# Patient Record
Sex: MALE | Race: WHITE | Employment: OTHER | URBAN - METROPOLITAN AREA
[De-identification: names, ages, dates, MRNs, and addresses within clinical notes are randomized per-mention and may not be internally consistent; named-entity substitution may affect disease eponyms.]

---

## 2017-02-24 RX ORDER — ENALAPRIL MALEATE 2.5 MG/1
2.5 TABLET ORAL DAILY
Qty: 90 TABLET | Refills: 3 | Status: SHIPPED | OUTPATIENT
Start: 2017-02-24 | End: 2018-05-31

## 2017-02-24 NOTE — TELEPHONE ENCOUNTER
High drug-drug interaction between enalapril and eplerenone-to [facility] to please review and advise if ok to proceed with refill

## 2017-03-16 ENCOUNTER — TELEPHONE (OUTPATIENT)
Dept: PULMONOLOGY | Facility: CLINIC | Age: 82
End: 2017-03-16

## 2017-03-16 NOTE — TELEPHONE ENCOUNTER
Sched pt for appt w/ [facility] on 3/20 @ 3:30 pm at [facility]. Marcela Mooney was given appt time, location, & parking. She stts [facility], [facility] [place], & she wonders if he should be in Pulmonary Rehab again.  Exp

## 2017-03-16 NOTE — TELEPHONE ENCOUNTER
Devon Quezada requesting pt to be seen sooner than next avail - just returned from [facility] & wanted to make sure pt had continuity of care from [address] to [address]. Pls call.  Thank you

## 2017-03-20 ENCOUNTER — OFFICE VISIT (OUTPATIENT)
Dept: PULMONOLOGY | Facility: CLINIC | Age: 82
End: 2017-03-20

## 2017-03-20 VITALS
HEIGHT: 72 IN | DIASTOLIC BLOOD PRESSURE: 55 MMHG | RESPIRATION RATE: 18 BRPM | OXYGEN SATURATION: 92 % | SYSTOLIC BLOOD PRESSURE: 86 MMHG | BODY MASS INDEX: 31.88 KG/M2 | WEIGHT: 235.38 LBS | HEART RATE: 82 BPM

## 2017-03-20 DIAGNOSIS — J43.1 PANLOBULAR EMPHYSEMA (HCC): Primary | ICD-10-CM

## 2017-03-20 PROCEDURE — 99213 OFFICE O/P EST LOW 20 MIN: CPT | Performed by: INTERNAL MEDICINE

## 2017-03-20 PROCEDURE — G0463 HOSPITAL OUTPT CLINIC VISIT: HCPCS | Performed by: INTERNAL MEDICINE

## 2017-03-20 RX ORDER — TRAMADOL HYDROCHLORIDE 50 MG/1
TABLET ORAL
COMMUNITY
Start: 2017-03-13 | End: 2017-10-10

## 2017-03-20 RX ORDER — LEVALBUTEROL INHALATION SOLUTION 1.25 MG/3ML
SOLUTION RESPIRATORY (INHALATION)
COMMUNITY
Start: 2017-01-25 | End: 2017-03-20

## 2017-03-20 RX ORDER — UMECLIDINIUM 62.5 UG/1
AEROSOL, POWDER ORAL
COMMUNITY
Start: 2017-02-17 | End: 2021-01-13

## 2017-03-20 RX ORDER — FLUTICASONE FUROATE AND VILANTEROL TRIFENATATE 100; 25 UG/1; UG/1
POWDER RESPIRATORY (INHALATION)
COMMUNITY
Start: 2017-02-17 | End: 2021-01-13

## 2017-03-31 ENCOUNTER — TELEPHONE (OUTPATIENT)
Dept: PULMONOLOGY | Facility: CLINIC | Age: 82
End: 2017-03-31

## 2017-03-31 DIAGNOSIS — J44.1 CHRONIC OBSTRUCTIVE PULMONARY DISEASE WITH ACUTE EXACERBATION (HCC): Primary | ICD-10-CM

## 2017-03-31 NOTE — TELEPHONE ENCOUNTER
Wife is Requesting to get an order for [facility].  She is requesting for the order to be faxed to [address] at [facility]

## 2017-04-06 ENCOUNTER — TELEPHONE (OUTPATIENT)
Dept: PULMONOLOGY | Facility: CLINIC | Age: 82
End: 2017-04-06

## 2017-04-06 NOTE — TELEPHONE ENCOUNTER
Spoke to Mary Jo from pulmonary rehab. She states patient states he is doing phase 3. Pramod Valverde know we just saw the patient on 03/20/17 that if patient feels he can do phase 3 PJC is ok with this.

## 2017-04-11 ENCOUNTER — OFFICE VISIT (OUTPATIENT)
Dept: INTERNAL MEDICINE CLINIC | Facility: CLINIC | Age: 82
End: 2017-04-11

## 2017-04-11 VITALS
OXYGEN SATURATION: 92 % | HEART RATE: 78 BPM | HEIGHT: 72 IN | SYSTOLIC BLOOD PRESSURE: 120 MMHG | WEIGHT: 238 LBS | DIASTOLIC BLOOD PRESSURE: 60 MMHG | BODY MASS INDEX: 32.23 KG/M2 | TEMPERATURE: 98 F

## 2017-04-11 DIAGNOSIS — I10 HYPERTENSION, ESSENTIAL: Primary | ICD-10-CM

## 2017-04-11 DIAGNOSIS — G57.12 MERALGIA PARAESTHETICA, LEFT: ICD-10-CM

## 2017-04-11 DIAGNOSIS — I25.5 ISCHEMIC CARDIOMYOPATHY: ICD-10-CM

## 2017-04-11 DIAGNOSIS — E78.5 HYPERLIPIDEMIA, UNSPECIFIED HYPERLIPIDEMIA TYPE: ICD-10-CM

## 2017-04-11 DIAGNOSIS — J44.9 CHRONIC OBSTRUCTIVE PULMONARY DISEASE, UNSPECIFIED COPD TYPE (HCC): ICD-10-CM

## 2017-04-11 PROCEDURE — 99214 OFFICE O/P EST MOD 30 MIN: CPT | Performed by: INTERNAL MEDICINE

## 2017-04-11 PROCEDURE — G0463 HOSPITAL OUTPT CLINIC VISIT: HCPCS | Performed by: INTERNAL MEDICINE

## 2017-04-11 NOTE — PROGRESS NOTES
Heather Pollack is a 80year old male who presents for     Here with his wife.    Check at 6 months. IHD with hx CABG:  Dr. Linda Godfrey did cardiac cath 11/9/16. EF 30-35%. Mild pulm HTN. 50% distal LM-no SVG identified.    Per pt was told his chronic ADAMS is fro COMPLETE  12-    Comment Scanned to media tab 12-    ELECTROCARDIOGRAM, COMPLETE  12-    Comment Scanned to media tab 12-    ELECTROCARDIOGRAM, COMPLETE  01-    Comment Scanned to media tab 01-    CABG  2006    Com kg)  10/11/16 : 243 lb 12.8 oz (110.587 kg)      General: appears well nourished and in no acute distress  Neck: no adenopathy or thyroid abnormality  Lungs: clear to auscultation  Heart: regular rhythm, S1S2 normal without murmur  Abdomen: soft, nontender normal off Kcl. Ret in 6 months.      Patient and his wife express understanding of above issues and plan      Current Outpatient Prescriptions:  BREO ELLIPTA 100-25 MCG/INH Inhalation Aerosol Powder, Breath Activated  Disp:  Rfl:    INCRUSE ELLIPTA 62.5 M Tessa Gunter MD  4/11/2017

## 2017-04-13 ENCOUNTER — CARDPULM VISIT (OUTPATIENT)
Dept: CARDIAC REHAB | Facility: HOSPITAL | Age: 82
End: 2017-04-13

## 2017-04-18 ENCOUNTER — APPOINTMENT (OUTPATIENT)
Dept: CARDIAC REHAB | Facility: HOSPITAL | Age: 82
End: 2017-04-18

## 2017-04-18 ENCOUNTER — APPOINTMENT (OUTPATIENT)
Dept: CARDIAC REHAB | Facility: HOSPITAL | Age: 82
End: 2017-04-18
Attending: INTERNAL MEDICINE

## 2017-04-20 ENCOUNTER — APPOINTMENT (OUTPATIENT)
Dept: CARDIAC REHAB | Facility: HOSPITAL | Age: 82
End: 2017-04-20
Attending: INTERNAL MEDICINE

## 2017-04-25 ENCOUNTER — APPOINTMENT (OUTPATIENT)
Dept: CARDIAC REHAB | Facility: HOSPITAL | Age: 82
End: 2017-04-25
Attending: INTERNAL MEDICINE

## 2017-04-27 ENCOUNTER — APPOINTMENT (OUTPATIENT)
Dept: CARDIAC REHAB | Facility: HOSPITAL | Age: 82
End: 2017-04-27
Attending: INTERNAL MEDICINE

## 2017-05-02 ENCOUNTER — APPOINTMENT (OUTPATIENT)
Dept: CARDIAC REHAB | Facility: HOSPITAL | Age: 82
End: 2017-05-02
Attending: INTERNAL MEDICINE
Payer: MEDICARE

## 2017-05-04 ENCOUNTER — APPOINTMENT (OUTPATIENT)
Dept: CARDIAC REHAB | Facility: HOSPITAL | Age: 82
End: 2017-05-04
Attending: INTERNAL MEDICINE
Payer: MEDICARE

## 2017-05-09 ENCOUNTER — APPOINTMENT (OUTPATIENT)
Dept: CARDIAC REHAB | Facility: HOSPITAL | Age: 82
End: 2017-05-09
Attending: INTERNAL MEDICINE
Payer: MEDICARE

## 2017-05-11 ENCOUNTER — APPOINTMENT (OUTPATIENT)
Dept: CARDIAC REHAB | Facility: HOSPITAL | Age: 82
End: 2017-05-11
Attending: INTERNAL MEDICINE
Payer: MEDICARE

## 2017-07-11 ENCOUNTER — CARDPULM VISIT (OUTPATIENT)
Dept: CARDIAC REHAB | Facility: HOSPITAL | Age: 82
End: 2017-07-11
Attending: INTERNAL MEDICINE

## 2017-07-24 ENCOUNTER — TELEPHONE (OUTPATIENT)
Dept: PULMONOLOGY | Facility: CLINIC | Age: 82
End: 2017-07-24

## 2017-08-03 ENCOUNTER — HOSPITAL ENCOUNTER (OUTPATIENT)
Dept: CT IMAGING | Facility: HOSPITAL | Age: 82
Discharge: HOME | End: 2017-08-03
Attending: INTERNAL MEDICINE
Payer: MEDICARE

## 2017-08-03 DIAGNOSIS — R91.1 PULMONARY NODULE: ICD-10-CM

## 2017-08-03 PROCEDURE — 71250 CT THORAX DX C-: CPT | Performed by: INTERNAL MEDICINE

## 2017-08-09 ENCOUNTER — TELEPHONE (OUTPATIENT)
Dept: PULMONOLOGY | Facility: CLINIC | Age: 82
End: 2017-08-09

## 2017-08-09 DIAGNOSIS — R91.8 PULMONARY NODULES: Primary | ICD-10-CM

## 2017-08-09 NOTE — TELEPHONE ENCOUNTER
----- Message from Pepe Brandt MD sent at 8/5/2017  7:45 PM CDT -----  RN, I spoke with the patient regarding the results of his CT scan the chest.  Please add to the calendar a repeat CT scan of the chest at the 6 month interval.

## 2017-10-10 ENCOUNTER — OFFICE VISIT (OUTPATIENT)
Dept: INTERNAL MEDICINE CLINIC | Facility: CLINIC | Age: 82
End: 2017-10-10

## 2017-10-10 VITALS
OXYGEN SATURATION: 93 % | WEIGHT: 244 LBS | DIASTOLIC BLOOD PRESSURE: 60 MMHG | HEART RATE: 64 BPM | TEMPERATURE: 98 F | SYSTOLIC BLOOD PRESSURE: 112 MMHG | HEIGHT: 72 IN | BODY MASS INDEX: 33.05 KG/M2

## 2017-10-10 DIAGNOSIS — E78.5 HYPERLIPIDEMIA, UNSPECIFIED HYPERLIPIDEMIA TYPE: ICD-10-CM

## 2017-10-10 DIAGNOSIS — I10 HYPERTENSION, ESSENTIAL: ICD-10-CM

## 2017-10-10 DIAGNOSIS — J44.9 CHRONIC OBSTRUCTIVE PULMONARY DISEASE, UNSPECIFIED COPD TYPE (HCC): ICD-10-CM

## 2017-10-10 DIAGNOSIS — I25.5 ISCHEMIC CARDIOMYOPATHY: ICD-10-CM

## 2017-10-10 DIAGNOSIS — J32.9 SINUSITIS, UNSPECIFIED CHRONICITY, UNSPECIFIED LOCATION: Primary | ICD-10-CM

## 2017-10-10 PROCEDURE — G0008 ADMIN INFLUENZA VIRUS VAC: HCPCS | Performed by: INTERNAL MEDICINE

## 2017-10-10 PROCEDURE — 99214 OFFICE O/P EST MOD 30 MIN: CPT | Performed by: INTERNAL MEDICINE

## 2017-10-10 PROCEDURE — 90653 IIV ADJUVANT VACCINE IM: CPT | Performed by: INTERNAL MEDICINE

## 2017-10-10 PROCEDURE — G0463 HOSPITAL OUTPT CLINIC VISIT: HCPCS | Performed by: INTERNAL MEDICINE

## 2017-10-10 RX ORDER — TRAMADOL HYDROCHLORIDE 50 MG/1
50 TABLET ORAL EVERY 12 HOURS PRN
Qty: 90 TABLET | Refills: 0 | Status: SHIPPED
Start: 2017-10-10 | End: 2018-04-10

## 2017-10-10 RX ORDER — CEFUROXIME AXETIL 250 MG/1
250 TABLET ORAL 2 TIMES DAILY
Qty: 20 TABLET | Refills: 0 | Status: SHIPPED | OUTPATIENT
Start: 2017-10-10 | End: 2018-04-10

## 2017-10-10 NOTE — PROGRESS NOTES
Fredis Thomas is a 80year old male who presents for     Here with his wife.    Check at 6 months. I have drainage coming down with mucus started a month ago. Post nasal drip. Yellow. His wife feels he has a sinus infection. rhinocort helps.  Has nasa Comment: Bypass-2006, double bypass  12-: ELECTROCARDIOGRAM, COMPLETE      Comment: Scanned to media tab 12- 12-: ELECTROCARDIOGRAM, COMPLETE      Comment: Scanned to media tab 12- 01-: ELECTROCARDIOGRAM, COMPLETE kg)  11/02/16 : 238 lb (108 kg)  O2 sat on RA is 84% --recheck on 2 L is 93%    General: appears well nourished and in no acute distress  Post pharynx ok. Nasal septal dev to L.    Neck: no adenopathy or thyroid abnormality  Lungs: clear to auscultation  He normal off Kcl.     Ret in 6 months. (after ret from [place])      Patient and his wife express understanding of above issues and plan         Current Outpatient Prescriptions:  TraMADol HCl 50 MG Oral Tab Take 1 tablet (50 mg total) by mouth every 12 (twelve) h Suspension 1 spray by Nasal route as needed. Disp:  Rfl:    Albuterol Sulfate HFA (PROAIR HFA) 108 (90 BASE) MCG/ACT Inhalation Aero Soln Inhale 2 puffs into the lungs as needed.    Disp:  Rfl:          Annelise Aguila MD  10/10/2017

## 2017-10-30 ENCOUNTER — TELEPHONE (OUTPATIENT)
Dept: PULMONOLOGY | Facility: CLINIC | Age: 82
End: 2017-10-30

## 2017-10-30 NOTE — TELEPHONE ENCOUNTER
Pts spouse calling for pt, requesting for pt to be seen asap due to pt has copd and is having breathing difficulties. Spouse indicates pt was instructed to call in if pt was not getting any better. Pls call wife at:[phone],thanks.   *Pt has a f/up grecia

## 2017-11-07 ENCOUNTER — OFFICE VISIT (OUTPATIENT)
Dept: PULMONOLOGY | Facility: CLINIC | Age: 82
End: 2017-11-07

## 2017-11-07 VITALS
SYSTOLIC BLOOD PRESSURE: 97 MMHG | BODY MASS INDEX: 32.91 KG/M2 | OXYGEN SATURATION: 93 % | HEART RATE: 75 BPM | HEIGHT: 72 IN | WEIGHT: 243 LBS | DIASTOLIC BLOOD PRESSURE: 61 MMHG | RESPIRATION RATE: 19 BRPM

## 2017-11-07 DIAGNOSIS — J43.1 PANLOBULAR EMPHYSEMA (HCC): Primary | ICD-10-CM

## 2017-11-07 PROCEDURE — G0463 HOSPITAL OUTPT CLINIC VISIT: HCPCS | Performed by: INTERNAL MEDICINE

## 2017-11-07 PROCEDURE — 99213 OFFICE O/P EST LOW 20 MIN: CPT | Performed by: INTERNAL MEDICINE

## 2017-11-07 RX ORDER — LEVOFLOXACIN 500 MG/1
500 TABLET, FILM COATED ORAL DAILY
Qty: 7 TABLET | Refills: 0 | Status: SHIPPED | OUTPATIENT
Start: 2017-11-07 | End: 2018-04-10

## 2017-11-07 RX ORDER — PREDNISONE 20 MG/1
TABLET ORAL
Qty: 10 TABLET | Refills: 0 | Status: SHIPPED | OUTPATIENT
Start: 2017-11-07 | End: 2018-04-10

## 2017-11-07 NOTE — PROGRESS NOTES
The patient is a 79-year-old male who I know well from prior evaluation comes in now for follow-up. He has underlying COPD and has a recurrent bronchitis now. He received a course of cefuroxime.   His supplemental oxygen does not work really well with res

## 2018-01-30 ENCOUNTER — TELEPHONE (OUTPATIENT)
Dept: PULMONOLOGY | Facility: CLINIC | Age: 83
End: 2018-01-30

## 2018-02-12 ENCOUNTER — TELEPHONE (OUTPATIENT)
Dept: PULMONOLOGY | Facility: CLINIC | Age: 83
End: 2018-02-12

## 2018-02-12 NOTE — TELEPHONE ENCOUNTER
Refer to TE 1/30/18-Pts wife states she has been trying to reach Managed care regarding PA needed for CT CHEST and has not been able to speak to anyone.  Pts wife requesting a call back [phone]

## 2018-02-12 NOTE — TELEPHONE ENCOUNTER
Managed Care: please contact patient wife re: PA for CT per message below. Let us know when completed.

## 2018-02-13 NOTE — TELEPHONE ENCOUNTER
Pt wife confirmed pt primary insurance is Medicare Part B, informed pt wife no prior authorization is necessary she could go ahead and schedule CT, pt wife voiced understanding.

## 2018-02-23 RX ORDER — ENALAPRIL MALEATE 2.5 MG/1
TABLET ORAL
Qty: 90 TABLET | Refills: 3 | OUTPATIENT
Start: 2018-02-23

## 2018-02-23 NOTE — TELEPHONE ENCOUNTER
Spoke to wife - we have not had any labwork since 2016, but wife reports to me that his [place] MD does his prescriptions;    They will follow up   No action at this time

## 2018-04-10 ENCOUNTER — OFFICE VISIT (OUTPATIENT)
Dept: INTERNAL MEDICINE CLINIC | Facility: CLINIC | Age: 83
End: 2018-04-10

## 2018-04-10 VITALS
TEMPERATURE: 98 F | OXYGEN SATURATION: 90 % | DIASTOLIC BLOOD PRESSURE: 64 MMHG | BODY MASS INDEX: 32.64 KG/M2 | WEIGHT: 241 LBS | SYSTOLIC BLOOD PRESSURE: 116 MMHG | HEIGHT: 72 IN | HEART RATE: 96 BPM

## 2018-04-10 DIAGNOSIS — E78.5 HYPERLIPIDEMIA, UNSPECIFIED HYPERLIPIDEMIA TYPE: ICD-10-CM

## 2018-04-10 DIAGNOSIS — J44.9 CHRONIC OBSTRUCTIVE PULMONARY DISEASE, UNSPECIFIED COPD TYPE (HCC): ICD-10-CM

## 2018-04-10 DIAGNOSIS — I10 HYPERTENSION, ESSENTIAL: Primary | ICD-10-CM

## 2018-04-10 DIAGNOSIS — I25.9 ISCHEMIC HEART DISEASE: ICD-10-CM

## 2018-04-10 PROCEDURE — G0463 HOSPITAL OUTPT CLINIC VISIT: HCPCS | Performed by: INTERNAL MEDICINE

## 2018-04-10 PROCEDURE — 99214 OFFICE O/P EST MOD 30 MIN: CPT | Performed by: INTERNAL MEDICINE

## 2018-04-10 RX ORDER — TRAMADOL HYDROCHLORIDE 50 MG/1
50 TABLET ORAL EVERY 12 HOURS PRN
Qty: 90 TABLET | Refills: 0 | Status: SHIPPED
Start: 2018-04-10 | End: 2018-10-09

## 2018-04-10 NOTE — PROGRESS NOTES
Francisco J Lazo is a [de-identified]year old male who presents for     Here with his wife.    Check at 6 months. IHD with hx CABG:  As review Dr. Lawson Santa did cardiac cath 11/9/16. EF 30-35%. Mild pulm HTN. 50% distal LM-no SVG identified.    No CP but has chronic ADAMS.   COMPLETE      Comment: Scanned to media tab 12- 01-: ELECTROCARDIOGRAM, COMPLETE      Comment: Scanned to media tab 01-  2012: EYE SURGERY      Comment: Eye surgery for strep infection   Family History   Problem Relation Age of Onset abnormality  Lungs: clear to auscultation, some fine exp wheezing.    Heart: regular rhythm, S1S2 normal without murmur  Abdomen: soft, nontender without mass or hepatosplenomegaly  Extremities: no edema  Neurologic: alert and oriented      ASSESSMENT AND P 90 tablet Rfl: 1   TraMADol HCl 50 MG Oral Tab Take 1 tablet (50 mg total) by mouth every 12 (twelve) hours as needed for Pain.  Disp: 90 tablet Rfl: 0   BREO ELLIPTA 100-25 MCG/INH Inhalation Aerosol Powder, Breath Activated  Disp:  Rfl:    INCRUSE ELLIPTA

## 2018-04-11 ENCOUNTER — HOSPITAL ENCOUNTER (OUTPATIENT)
Dept: CT IMAGING | Facility: HOSPITAL | Age: 83
Discharge: HOME | End: 2018-04-11
Attending: INTERNAL MEDICINE
Payer: MEDICARE

## 2018-04-11 DIAGNOSIS — R91.8 PULMONARY NODULES: ICD-10-CM

## 2018-04-11 PROCEDURE — 71250 CT THORAX DX C-: CPT | Performed by: INTERNAL MEDICINE

## 2018-04-16 ENCOUNTER — OFFICE VISIT (OUTPATIENT)
Dept: PULMONOLOGY | Facility: CLINIC | Age: 83
End: 2018-04-16

## 2018-04-16 VITALS
HEART RATE: 99 BPM | HEIGHT: 72 IN | WEIGHT: 240 LBS | SYSTOLIC BLOOD PRESSURE: 106 MMHG | DIASTOLIC BLOOD PRESSURE: 68 MMHG | OXYGEN SATURATION: 92 % | BODY MASS INDEX: 32.51 KG/M2

## 2018-04-16 DIAGNOSIS — R06.00 DYSPNEA ON EXERTION: ICD-10-CM

## 2018-04-16 DIAGNOSIS — J44.9 CHRONIC OBSTRUCTIVE PULMONARY DISEASE, UNSPECIFIED COPD TYPE (HCC): ICD-10-CM

## 2018-04-16 DIAGNOSIS — J43.1 PANLOBULAR EMPHYSEMA (HCC): Primary | ICD-10-CM

## 2018-04-16 PROCEDURE — 99213 OFFICE O/P EST LOW 20 MIN: CPT | Performed by: INTERNAL MEDICINE

## 2018-04-16 PROCEDURE — G0463 HOSPITAL OUTPT CLINIC VISIT: HCPCS | Performed by: INTERNAL MEDICINE

## 2018-04-16 NOTE — PROGRESS NOTES
The patient is an 21-year-old male who I know well from prior evaluation of comes in now for follow-up. His CT scan the chest was stable. His chest x-ray from [place] recently was also unremarkable except for hyperinflation.   He is doing well clinically lifestyle.

## 2018-04-19 ENCOUNTER — TELEPHONE (OUTPATIENT)
Dept: PULMONOLOGY | Facility: CLINIC | Age: 83
End: 2018-04-19

## 2018-04-19 NOTE — TELEPHONE ENCOUNTER
Per [facility] lost the bid medicare bid for wheelchairs. Pts wife Maria De Jesus Multani notified and is aware that they should call their insurance  since it is listed as first insurance.  Steven Junior she will call  and give us a call with the DME p

## 2018-05-11 ENCOUNTER — TELEPHONE (OUTPATIENT)
Dept: PULMONOLOGY | Facility: CLINIC | Age: 83
End: 2018-05-11

## 2018-05-11 DIAGNOSIS — J43.1 PANLOBULAR EMPHYSEMA (HCC): Primary | ICD-10-CM

## 2018-05-11 NOTE — TELEPHONE ENCOUNTER
Pts wife came in asking for recommendations for a company to get a WC previous(  TE 4-19 with [facility] LPN) wife wants to speak to someone pls advise

## 2018-05-11 NOTE — TELEPHONE ENCOUNTER
Spoke to pt who sttd that medicare should be first on insurance info. Spoke to [place] [id number] who corrected the issue. Pt notified that [facility] accepts medicare for First [employer] [phone]. Pt wife stts she wants order faxed to [email or web address].  Or

## 2018-06-14 NOTE — TELEPHONE ENCOUNTER
Wheelchair order form and addendum to OV notes from 4-16-18 faxed to [facility] [phone]. Form sent to HIM for scanning.

## 2018-06-27 ENCOUNTER — LAB ENCOUNTER (OUTPATIENT)
Dept: LAB | Age: 83
End: 2018-06-27
Attending: NURSE PRACTITIONER
Payer: MEDICARE

## 2018-06-27 DIAGNOSIS — I50.22 CHRONIC SYSTOLIC CONGESTIVE HEART FAILURE (HCC): ICD-10-CM

## 2018-06-27 PROCEDURE — 36415 COLL VENOUS BLD VENIPUNCTURE: CPT

## 2018-06-27 PROCEDURE — 80048 BASIC METABOLIC PNL TOTAL CA: CPT

## 2018-08-29 ENCOUNTER — CARDPULM VISIT (OUTPATIENT)
Dept: CARDIAC REHAB | Facility: HOSPITAL | Age: 83
End: 2018-08-29
Attending: INTERNAL MEDICINE
Payer: MEDICARE

## 2018-08-29 DIAGNOSIS — I50.20 SYSTOLIC CONGESTIVE HEART FAILURE (HCC): ICD-10-CM

## 2018-09-05 ENCOUNTER — CARDPULM VISIT (OUTPATIENT)
Dept: CARDIAC REHAB | Facility: HOSPITAL | Age: 83
End: 2018-09-05
Attending: INTERNAL MEDICINE
Payer: MEDICARE

## 2018-09-05 PROCEDURE — 93798 PHYS/QHP OP CAR RHAB W/ECG: CPT

## 2018-09-07 ENCOUNTER — CARDPULM VISIT (OUTPATIENT)
Dept: CARDIAC REHAB | Facility: HOSPITAL | Age: 83
End: 2018-09-07
Attending: INTERNAL MEDICINE
Payer: MEDICARE

## 2018-09-07 PROCEDURE — 93798 PHYS/QHP OP CAR RHAB W/ECG: CPT

## 2018-09-10 ENCOUNTER — CARDPULM VISIT (OUTPATIENT)
Dept: CARDIAC REHAB | Facility: HOSPITAL | Age: 83
End: 2018-09-10
Attending: INTERNAL MEDICINE
Payer: MEDICARE

## 2018-09-10 PROCEDURE — 93798 PHYS/QHP OP CAR RHAB W/ECG: CPT

## 2018-09-12 ENCOUNTER — CARDPULM VISIT (OUTPATIENT)
Dept: CARDIAC REHAB | Facility: HOSPITAL | Age: 83
End: 2018-09-12
Attending: INTERNAL MEDICINE
Payer: MEDICARE

## 2018-09-12 PROCEDURE — 93798 PHYS/QHP OP CAR RHAB W/ECG: CPT

## 2018-09-14 ENCOUNTER — CARDPULM VISIT (OUTPATIENT)
Dept: CARDIAC REHAB | Facility: HOSPITAL | Age: 83
End: 2018-09-14
Attending: INTERNAL MEDICINE
Payer: MEDICARE

## 2018-09-14 PROCEDURE — 93798 PHYS/QHP OP CAR RHAB W/ECG: CPT

## 2018-09-17 ENCOUNTER — CARDPULM VISIT (OUTPATIENT)
Dept: CARDIAC REHAB | Facility: HOSPITAL | Age: 83
End: 2018-09-17
Attending: INTERNAL MEDICINE
Payer: MEDICARE

## 2018-09-17 PROCEDURE — 93798 PHYS/QHP OP CAR RHAB W/ECG: CPT

## 2018-09-19 ENCOUNTER — CARDPULM VISIT (OUTPATIENT)
Dept: CARDIAC REHAB | Facility: HOSPITAL | Age: 83
End: 2018-09-19
Attending: INTERNAL MEDICINE
Payer: MEDICARE

## 2018-09-19 PROCEDURE — 93798 PHYS/QHP OP CAR RHAB W/ECG: CPT

## 2018-09-21 ENCOUNTER — CARDPULM VISIT (OUTPATIENT)
Dept: CARDIAC REHAB | Facility: HOSPITAL | Age: 83
End: 2018-09-21
Attending: INTERNAL MEDICINE
Payer: MEDICARE

## 2018-09-21 PROCEDURE — 93798 PHYS/QHP OP CAR RHAB W/ECG: CPT

## 2018-09-24 ENCOUNTER — APPOINTMENT (OUTPATIENT)
Dept: CARDIAC REHAB | Facility: HOSPITAL | Age: 83
End: 2018-09-24
Attending: INTERNAL MEDICINE
Payer: MEDICARE

## 2018-09-26 ENCOUNTER — CARDPULM VISIT (OUTPATIENT)
Dept: CARDIAC REHAB | Facility: HOSPITAL | Age: 83
End: 2018-09-26
Attending: INTERNAL MEDICINE
Payer: MEDICARE

## 2018-09-26 PROCEDURE — 93798 PHYS/QHP OP CAR RHAB W/ECG: CPT

## 2018-09-28 ENCOUNTER — CARDPULM VISIT (OUTPATIENT)
Dept: CARDIAC REHAB | Facility: HOSPITAL | Age: 83
End: 2018-09-28
Attending: INTERNAL MEDICINE
Payer: MEDICARE

## 2018-09-28 PROCEDURE — 93798 PHYS/QHP OP CAR RHAB W/ECG: CPT

## 2018-10-01 ENCOUNTER — CARDPULM VISIT (OUTPATIENT)
Dept: CARDIAC REHAB | Facility: HOSPITAL | Age: 83
End: 2018-10-01
Attending: INTERNAL MEDICINE
Payer: MEDICARE

## 2018-10-01 PROCEDURE — 93798 PHYS/QHP OP CAR RHAB W/ECG: CPT

## 2018-10-03 ENCOUNTER — CARDPULM VISIT (OUTPATIENT)
Dept: CARDIAC REHAB | Facility: HOSPITAL | Age: 83
End: 2018-10-03
Attending: INTERNAL MEDICINE
Payer: MEDICARE

## 2018-10-03 PROCEDURE — 93798 PHYS/QHP OP CAR RHAB W/ECG: CPT

## 2018-10-05 ENCOUNTER — CARDPULM VISIT (OUTPATIENT)
Dept: CARDIAC REHAB | Facility: HOSPITAL | Age: 83
End: 2018-10-05
Attending: INTERNAL MEDICINE
Payer: MEDICARE

## 2018-10-05 PROCEDURE — 93798 PHYS/QHP OP CAR RHAB W/ECG: CPT

## 2018-10-08 ENCOUNTER — APPOINTMENT (OUTPATIENT)
Dept: CARDIAC REHAB | Facility: HOSPITAL | Age: 83
End: 2018-10-08
Attending: INTERNAL MEDICINE
Payer: MEDICARE

## 2018-10-09 ENCOUNTER — OFFICE VISIT (OUTPATIENT)
Dept: INTERNAL MEDICINE CLINIC | Facility: CLINIC | Age: 83
End: 2018-10-09
Payer: MEDICARE

## 2018-10-09 VITALS
BODY MASS INDEX: 32.64 KG/M2 | OXYGEN SATURATION: 93 % | DIASTOLIC BLOOD PRESSURE: 64 MMHG | WEIGHT: 241 LBS | HEIGHT: 72 IN | SYSTOLIC BLOOD PRESSURE: 110 MMHG | HEART RATE: 72 BPM | TEMPERATURE: 98 F

## 2018-10-09 DIAGNOSIS — I51.9 LV DYSFUNCTION: ICD-10-CM

## 2018-10-09 DIAGNOSIS — J44.9 CHRONIC OBSTRUCTIVE PULMONARY DISEASE, UNSPECIFIED COPD TYPE (HCC): Primary | ICD-10-CM

## 2018-10-09 DIAGNOSIS — I10 HYPERTENSION, ESSENTIAL: ICD-10-CM

## 2018-10-09 PROCEDURE — 99214 OFFICE O/P EST MOD 30 MIN: CPT | Performed by: INTERNAL MEDICINE

## 2018-10-09 PROCEDURE — 90653 IIV ADJUVANT VACCINE IM: CPT | Performed by: INTERNAL MEDICINE

## 2018-10-09 PROCEDURE — G0008 ADMIN INFLUENZA VIRUS VAC: HCPCS | Performed by: INTERNAL MEDICINE

## 2018-10-09 PROCEDURE — G0463 HOSPITAL OUTPT CLINIC VISIT: HCPCS | Performed by: INTERNAL MEDICINE

## 2018-10-09 RX ORDER — TRAMADOL HYDROCHLORIDE 50 MG/1
50 TABLET ORAL EVERY 12 HOURS PRN
Qty: 90 TABLET | Refills: 0 | Status: SHIPPED
Start: 2018-10-09 | End: 2019-04-09

## 2018-10-09 NOTE — PROGRESS NOTES
Michelet Garcia is a [de-identified]year old male who presents for     Here with his wife.    Check at 6 months.     IHD with hx CABG--HF  Dr Letty Terrazas added Jean Fails. Helped some. Going to cardiac rehab. No CP. Not using NTG.      Dr Letty Terrazas said difficult combination of emph SURGERY  2012    Eye surgery for strep infection      Family History   Problem Relation Age of Onset   • Cancer Sister         unknown skin CA   • Macular degeneration Sister    • Cancer Father          age 80 Cancer-bladder    • Alcohol and Other Diso Hypertension-controlled on entresto and coreg. No longer on metoprolol ER 75 mg daily or enalapril.     COPD-on  Breo,  incruse ellipta, proair inhaler and xopenex prn in neb. Uses 2 liters oxygen at night and prn day.    s/p pulm rehab.  CT chest 8/3/ 25 MG Oral Tab Take 1 tablet (25 mg total) by mouth once daily. Disp: 24 tablet Rfl: 0   TraMADol HCl 50 MG Oral Tab Take 1 tablet (50 mg total) by mouth every 12 (twelve) hours as needed for Pain.  Disp: 90 tablet Rfl: 0   BREO ELLIPTA 100-25 MCG/INH Inhal

## 2018-10-10 ENCOUNTER — APPOINTMENT (OUTPATIENT)
Dept: CARDIAC REHAB | Facility: HOSPITAL | Age: 83
End: 2018-10-10
Attending: INTERNAL MEDICINE
Payer: MEDICARE

## 2018-10-12 ENCOUNTER — CARDPULM VISIT (OUTPATIENT)
Dept: CARDIAC REHAB | Facility: HOSPITAL | Age: 83
End: 2018-10-12
Attending: INTERNAL MEDICINE
Payer: MEDICARE

## 2018-10-12 PROCEDURE — 93798 PHYS/QHP OP CAR RHAB W/ECG: CPT

## 2018-10-17 ENCOUNTER — CARDPULM VISIT (OUTPATIENT)
Dept: CARDIAC REHAB | Facility: HOSPITAL | Age: 83
End: 2018-10-17
Attending: INTERNAL MEDICINE
Payer: MEDICARE

## 2018-10-17 PROCEDURE — 93798 PHYS/QHP OP CAR RHAB W/ECG: CPT

## 2018-10-19 ENCOUNTER — CARDPULM VISIT (OUTPATIENT)
Dept: CARDIAC REHAB | Facility: HOSPITAL | Age: 83
End: 2018-10-19
Attending: INTERNAL MEDICINE
Payer: MEDICARE

## 2018-10-19 PROCEDURE — 93798 PHYS/QHP OP CAR RHAB W/ECG: CPT

## 2018-10-22 ENCOUNTER — CARDPULM VISIT (OUTPATIENT)
Dept: CARDIAC REHAB | Facility: HOSPITAL | Age: 83
End: 2018-10-22
Attending: INTERNAL MEDICINE
Payer: MEDICARE

## 2018-10-22 PROCEDURE — 93798 PHYS/QHP OP CAR RHAB W/ECG: CPT

## 2018-10-23 ENCOUNTER — TELEPHONE (OUTPATIENT)
Dept: PULMONOLOGY | Facility: CLINIC | Age: 83
End: 2018-10-23

## 2018-10-23 NOTE — TELEPHONE ENCOUNTER
Pt wife informed pt would need face to face office visit, ambulatory oximetry qualifying pt for portable concentrator. Pt oxygen liter flow fluctuates between 2-4 L, pt referred by pulm rehab for POC. Explained insurance guidelines f2f, amb ox w/n 30 days.

## 2018-10-24 ENCOUNTER — CARDPULM VISIT (OUTPATIENT)
Dept: CARDIAC REHAB | Facility: HOSPITAL | Age: 83
End: 2018-10-24
Attending: INTERNAL MEDICINE
Payer: MEDICARE

## 2018-10-24 PROCEDURE — 93798 PHYS/QHP OP CAR RHAB W/ECG: CPT

## 2018-10-29 ENCOUNTER — CARDPULM VISIT (OUTPATIENT)
Dept: CARDIAC REHAB | Facility: HOSPITAL | Age: 83
End: 2018-10-29
Attending: INTERNAL MEDICINE
Payer: MEDICARE

## 2018-10-29 PROCEDURE — 93798 PHYS/QHP OP CAR RHAB W/ECG: CPT

## 2018-10-31 ENCOUNTER — CARDPULM VISIT (OUTPATIENT)
Dept: CARDIAC REHAB | Facility: HOSPITAL | Age: 83
End: 2018-10-31
Attending: INTERNAL MEDICINE
Payer: MEDICARE

## 2018-10-31 PROCEDURE — 93798 PHYS/QHP OP CAR RHAB W/ECG: CPT

## 2018-11-02 ENCOUNTER — CARDPULM VISIT (OUTPATIENT)
Dept: CARDIAC REHAB | Facility: HOSPITAL | Age: 83
End: 2018-11-02
Attending: INTERNAL MEDICINE
Payer: MEDICARE

## 2018-11-02 PROCEDURE — 93798 PHYS/QHP OP CAR RHAB W/ECG: CPT

## 2018-11-05 ENCOUNTER — CARDPULM VISIT (OUTPATIENT)
Dept: CARDIAC REHAB | Facility: HOSPITAL | Age: 83
End: 2018-11-05
Attending: INTERNAL MEDICINE
Payer: MEDICARE

## 2018-11-05 PROCEDURE — 93798 PHYS/QHP OP CAR RHAB W/ECG: CPT

## 2018-11-07 ENCOUNTER — CARDPULM VISIT (OUTPATIENT)
Dept: CARDIAC REHAB | Facility: HOSPITAL | Age: 83
End: 2018-11-07
Attending: INTERNAL MEDICINE
Payer: MEDICARE

## 2018-11-07 PROCEDURE — 93798 PHYS/QHP OP CAR RHAB W/ECG: CPT

## 2018-11-09 ENCOUNTER — CARDPULM VISIT (OUTPATIENT)
Dept: CARDIAC REHAB | Facility: HOSPITAL | Age: 83
End: 2018-11-09
Attending: INTERNAL MEDICINE
Payer: MEDICARE

## 2018-11-09 PROCEDURE — 93798 PHYS/QHP OP CAR RHAB W/ECG: CPT

## 2018-11-12 ENCOUNTER — CARDPULM VISIT (OUTPATIENT)
Dept: CARDIAC REHAB | Facility: HOSPITAL | Age: 83
End: 2018-11-12
Attending: INTERNAL MEDICINE
Payer: MEDICARE

## 2018-11-12 PROCEDURE — 93798 PHYS/QHP OP CAR RHAB W/ECG: CPT

## 2018-11-14 ENCOUNTER — CARDPULM VISIT (OUTPATIENT)
Dept: CARDIAC REHAB | Facility: HOSPITAL | Age: 83
End: 2018-11-14
Attending: INTERNAL MEDICINE
Payer: MEDICARE

## 2018-11-14 PROCEDURE — 93798 PHYS/QHP OP CAR RHAB W/ECG: CPT

## 2018-11-19 ENCOUNTER — CARDPULM VISIT (OUTPATIENT)
Dept: CARDIAC REHAB | Facility: HOSPITAL | Age: 83
End: 2018-11-19
Attending: INTERNAL MEDICINE
Payer: MEDICARE

## 2018-11-19 PROCEDURE — 93798 PHYS/QHP OP CAR RHAB W/ECG: CPT

## 2018-11-20 ENCOUNTER — OFFICE VISIT (OUTPATIENT)
Dept: PULMONOLOGY | Facility: CLINIC | Age: 83
End: 2018-11-20
Payer: MEDICARE

## 2018-11-20 VITALS
RESPIRATION RATE: 18 BRPM | DIASTOLIC BLOOD PRESSURE: 61 MMHG | HEIGHT: 72 IN | WEIGHT: 239.88 LBS | HEART RATE: 58 BPM | SYSTOLIC BLOOD PRESSURE: 96 MMHG | BODY MASS INDEX: 32.49 KG/M2 | OXYGEN SATURATION: 95 %

## 2018-11-20 DIAGNOSIS — J43.1 PANLOBULAR EMPHYSEMA (HCC): Primary | ICD-10-CM

## 2018-11-20 PROCEDURE — 99213 OFFICE O/P EST LOW 20 MIN: CPT | Performed by: INTERNAL MEDICINE

## 2018-11-20 PROCEDURE — G0463 HOSPITAL OUTPT CLINIC VISIT: HCPCS | Performed by: INTERNAL MEDICINE

## 2018-11-20 NOTE — PROGRESS NOTES
The patient is an 47-year-old male who I know well from prior evaluation comes in now for follow-up.   In general, he is doing well except that he has significant dyspnea on exertion and he desaturates with the conserving device intermittent puff of nasal c

## 2018-11-21 ENCOUNTER — CARDPULM VISIT (OUTPATIENT)
Dept: CARDIAC REHAB | Facility: HOSPITAL | Age: 83
End: 2018-11-21
Attending: INTERNAL MEDICINE
Payer: MEDICARE

## 2018-11-21 PROCEDURE — 93798 PHYS/QHP OP CAR RHAB W/ECG: CPT

## 2018-11-26 ENCOUNTER — APPOINTMENT (OUTPATIENT)
Dept: CARDIAC REHAB | Facility: HOSPITAL | Age: 83
End: 2018-11-26
Attending: INTERNAL MEDICINE
Payer: MEDICARE

## 2018-11-28 ENCOUNTER — APPOINTMENT (OUTPATIENT)
Dept: CARDIAC REHAB | Facility: HOSPITAL | Age: 83
End: 2018-11-28
Attending: INTERNAL MEDICINE
Payer: MEDICARE

## 2018-11-30 ENCOUNTER — CARDPULM VISIT (OUTPATIENT)
Dept: CARDIAC REHAB | Facility: HOSPITAL | Age: 83
End: 2018-11-30
Attending: INTERNAL MEDICINE
Payer: MEDICARE

## 2018-11-30 PROCEDURE — 93798 PHYS/QHP OP CAR RHAB W/ECG: CPT

## 2018-12-03 ENCOUNTER — CARDPULM VISIT (OUTPATIENT)
Dept: CARDIAC REHAB | Facility: HOSPITAL | Age: 83
End: 2018-12-03
Attending: INTERNAL MEDICINE
Payer: MEDICARE

## 2018-12-03 PROCEDURE — 93798 PHYS/QHP OP CAR RHAB W/ECG: CPT

## 2018-12-05 ENCOUNTER — APPOINTMENT (OUTPATIENT)
Dept: CARDIAC REHAB | Facility: HOSPITAL | Age: 83
End: 2018-12-05
Attending: INTERNAL MEDICINE
Payer: MEDICARE

## 2019-04-09 ENCOUNTER — OFFICE VISIT (OUTPATIENT)
Dept: INTERNAL MEDICINE CLINIC | Facility: CLINIC | Age: 84
End: 2019-04-09
Payer: MEDICARE

## 2019-04-09 VITALS
BODY MASS INDEX: 32.1 KG/M2 | DIASTOLIC BLOOD PRESSURE: 52 MMHG | TEMPERATURE: 98 F | RESPIRATION RATE: 20 BRPM | WEIGHT: 237 LBS | HEIGHT: 72 IN | HEART RATE: 82 BPM | OXYGEN SATURATION: 94 % | SYSTOLIC BLOOD PRESSURE: 100 MMHG

## 2019-04-09 DIAGNOSIS — I51.9 LV DYSFUNCTION: ICD-10-CM

## 2019-04-09 DIAGNOSIS — J44.9 CHRONIC OBSTRUCTIVE PULMONARY DISEASE, UNSPECIFIED COPD TYPE (HCC): ICD-10-CM

## 2019-04-09 DIAGNOSIS — I25.9 ISCHEMIC HEART DISEASE: ICD-10-CM

## 2019-04-09 DIAGNOSIS — I25.10 CORONARY ARTERY DISEASE INVOLVING NATIVE CORONARY ARTERY OF NATIVE HEART WITHOUT ANGINA PECTORIS: ICD-10-CM

## 2019-04-09 DIAGNOSIS — J40 BRONCHITIS: ICD-10-CM

## 2019-04-09 DIAGNOSIS — I10 HYPERTENSION, ESSENTIAL: ICD-10-CM

## 2019-04-09 DIAGNOSIS — J43.1 PANLOBULAR EMPHYSEMA (HCC): ICD-10-CM

## 2019-04-09 DIAGNOSIS — I25.5 ISCHEMIC CARDIOMYOPATHY: ICD-10-CM

## 2019-04-09 PROCEDURE — 99214 OFFICE O/P EST MOD 30 MIN: CPT | Performed by: INTERNAL MEDICINE

## 2019-04-09 PROCEDURE — G0463 HOSPITAL OUTPT CLINIC VISIT: HCPCS | Performed by: INTERNAL MEDICINE

## 2019-04-09 RX ORDER — AZITHROMYCIN 250 MG/1
TABLET, FILM COATED ORAL
Qty: 1 PACKAGE | Refills: 0 | Status: SHIPPED | OUTPATIENT
Start: 2019-04-09 | End: 2019-10-01

## 2019-04-09 RX ORDER — TRAMADOL HYDROCHLORIDE 50 MG/1
50 TABLET ORAL EVERY 12 HOURS PRN
Qty: 90 TABLET | Refills: 0 | Status: SHIPPED
Start: 2019-04-09 | End: 2019-11-11

## 2019-04-09 RX ORDER — CARVEDILOL 12.5 MG/1
6.25 TABLET ORAL 2 TIMES DAILY
Qty: 1 TABLET | Refills: 0 | Status: SHIPPED | OUTPATIENT
Start: 2019-04-09 | End: 2019-04-15

## 2019-04-09 NOTE — PROGRESS NOTES
Scout López is a 82year old male who presents for     Here with his wife.    Check at 6 months. Ret from [facility] 3/20/19 and is returning to [facility] in May. (5/8 to 6/1/19)    \"I have a bronchial infection since March 20th\". Cough with light yellow mucus.  Molly Mancera ELECTROCARDIOGRAM, COMPLETE  12-    Scanned to media tab 12-   • ELECTROCARDIOGRAM, COMPLETE  12-    Scanned to media tab 12-   • ELECTROCARDIOGRAM, COMPLETE  01-    Scanned to media tab 01-   • EYE SURGERY  2012 (109.3 kg)  09/26/18 : 239 lb (108.4 kg)  06/25/18 : 246 lb (111.6 kg)      General: appears well nourished and in no acute distress  Neck: no adenopathy   Lungs: min rhonchi. Coughs at times.    Heart: regular rhythm, S1S2 normal without murmur  Abdomen: s ok.   6/27/18-YURI-ok.   Had labs done by his PCP in [place] in 1/2019.      Ret in 6 months.      Patient and his wife express understanding of above issues and plan        Current Outpatient Medications:  traMADol HCl 50 MG Oral Tab Take 1 tablet (50 mg total) (RHINOCORT AQUA) 32 MCG/ACT Nasal Suspension 1 spray by Nasal route as needed. Disp:  Rfl:    Albuterol Sulfate HFA (PROAIR HFA) 108 (90 BASE) MCG/ACT Inhalation Aero Soln Inhale 2 puffs into the lungs as needed.    Disp:  Rfl:          SENG Sanabria

## 2019-04-15 ENCOUNTER — OFFICE VISIT (OUTPATIENT)
Dept: PULMONOLOGY | Facility: CLINIC | Age: 84
End: 2019-04-15
Payer: MEDICARE

## 2019-04-15 VITALS
RESPIRATION RATE: 16 BRPM | HEART RATE: 61 BPM | SYSTOLIC BLOOD PRESSURE: 78 MMHG | DIASTOLIC BLOOD PRESSURE: 42 MMHG | HEIGHT: 72 IN | WEIGHT: 239 LBS | OXYGEN SATURATION: 92 % | BODY MASS INDEX: 32.37 KG/M2

## 2019-04-15 DIAGNOSIS — J43.1 PANLOBULAR EMPHYSEMA (HCC): Primary | ICD-10-CM

## 2019-04-15 PROCEDURE — 99213 OFFICE O/P EST LOW 20 MIN: CPT | Performed by: INTERNAL MEDICINE

## 2019-04-15 PROCEDURE — G0463 HOSPITAL OUTPT CLINIC VISIT: HCPCS | Performed by: INTERNAL MEDICINE

## 2019-04-15 RX ORDER — PREDNISONE 20 MG/1
TABLET ORAL
Qty: 10 TABLET | Refills: 0 | Status: SHIPPED | OUTPATIENT
Start: 2019-04-15 | End: 2019-10-07 | Stop reason: ALTCHOICE

## 2019-04-15 NOTE — PROGRESS NOTES
The patient is an 72-year-old male who I know well from prior evaluation comes in for follow-up. He has had a bronchitic syndrome over the last several weeks. He received a Z-Chuck and that has resulted in improvement but not complete resolve.   He was seen

## 2019-04-17 ENCOUNTER — LAB ENCOUNTER (OUTPATIENT)
Dept: LAB | Age: 84
End: 2019-04-17
Attending: INTERNAL MEDICINE
Payer: MEDICARE

## 2019-04-17 DIAGNOSIS — I50.22 CHRONIC SYSTOLIC CONGESTIVE HEART FAILURE (HCC): ICD-10-CM

## 2019-04-17 PROCEDURE — 36415 COLL VENOUS BLD VENIPUNCTURE: CPT

## 2019-04-17 PROCEDURE — 80048 BASIC METABOLIC PNL TOTAL CA: CPT

## 2019-08-29 RX ORDER — TRAMADOL HYDROCHLORIDE 50 MG/1
50 TABLET ORAL EVERY 12 HOURS PRN
Qty: 90 TABLET | Refills: 0 | Status: CANCELLED
Start: 2019-08-29

## 2019-08-29 NOTE — TELEPHONE ENCOUNTER
To nursing, please call in Refill (I'm not in office today). tramadol 50 mg bid prn pain #90. If the pharmacy says they can only give 1 mo at a time, then change to #60. Thanks.

## 2019-08-29 NOTE — TELEPHONE ENCOUNTER
To MD:  The above refill request is for a controlled substance. Please review pended medication order. Print and sign for staff to fax to pharmacy.     Tari Paul  - 4/9/2019 #60/0    Last refilled - 4/9/2019 #90/0

## 2019-08-29 NOTE — TELEPHONE ENCOUNTER
Spoke to [place] at [facility] and called in rx per MD written order. Spoke to spouse (ok per hipaa) and advised rx approved; wife verbalized understanding.

## 2019-10-01 ENCOUNTER — OFFICE VISIT (OUTPATIENT)
Dept: INTERNAL MEDICINE CLINIC | Facility: CLINIC | Age: 84
End: 2019-10-01
Payer: MEDICARE

## 2019-10-01 VITALS
HEART RATE: 74 BPM | SYSTOLIC BLOOD PRESSURE: 110 MMHG | DIASTOLIC BLOOD PRESSURE: 60 MMHG | BODY MASS INDEX: 32.23 KG/M2 | HEIGHT: 72 IN | WEIGHT: 238 LBS | TEMPERATURE: 98 F | OXYGEN SATURATION: 89 %

## 2019-10-01 DIAGNOSIS — E78.5 HYPERLIPIDEMIA, UNSPECIFIED HYPERLIPIDEMIA TYPE: ICD-10-CM

## 2019-10-01 DIAGNOSIS — K62.5 RECTAL BLEEDING: Primary | ICD-10-CM

## 2019-10-01 DIAGNOSIS — I25.9 ISCHEMIC HEART DISEASE: ICD-10-CM

## 2019-10-01 DIAGNOSIS — I51.9 LV DYSFUNCTION: ICD-10-CM

## 2019-10-01 DIAGNOSIS — J44.9 CHRONIC OBSTRUCTIVE PULMONARY DISEASE, UNSPECIFIED COPD TYPE (HCC): ICD-10-CM

## 2019-10-01 DIAGNOSIS — I10 HYPERTENSION, ESSENTIAL: ICD-10-CM

## 2019-10-01 PROCEDURE — G0008 ADMIN INFLUENZA VIRUS VAC: HCPCS | Performed by: INTERNAL MEDICINE

## 2019-10-01 PROCEDURE — 90662 IIV NO PRSV INCREASED AG IM: CPT | Performed by: INTERNAL MEDICINE

## 2019-10-01 PROCEDURE — G0463 HOSPITAL OUTPT CLINIC VISIT: HCPCS | Performed by: INTERNAL MEDICINE

## 2019-10-01 PROCEDURE — 99214 OFFICE O/P EST MOD 30 MIN: CPT | Performed by: INTERNAL MEDICINE

## 2019-10-01 NOTE — PROGRESS NOTES
Amadeo Brink is a 82year old male who presents for     Here with his wife.    Check at 6 months. Returning to [facility] 12/2019. COPD--Has chronic ADAMS-stable. Has inhaler breo and incruse ellipta. Home neb.    Spits up sputum 2 times a day which is his deficiency 1    in [place]      Past Surgical History:   Procedure Laterality Date   • CABG  2006    Bypass-2006, double bypass   • ELECTROCARDIOGRAM, COMPLETE  12-    Scanned to media tab 12-   • ELECTROCARDIOGRAM, COMPLETE  12- Encounters:  10/01/19 : 238 lb (108 kg)  04/15/19 : 239 lb (108.4 kg)  04/09/19 : 237 lb (107.5 kg)  04/03/19 : 236 lb (107 kg)  11/20/18 : 239 lb 14.4 oz (108.8 kg)  10/09/18 : 241 lb (109.3 kg)      General: appears well nourished and in no acute distres L eye in 1/13 by Dr. Lucas Pod maintenance:  Vaccines: shingrix 2011. shingrix discussed 4/10/18 & 10/1/19. Pnvx 2009 in [place]. Pt thinks he had a pmvx booster at pharmacy--he'll get date if possible  . prevnar 9/4/15.  flu shot 10/1/19.    Colon daily. Disp: 1 tablet Rfl: 0   aspirin 81 MG Oral Tab Take 81 mg by mouth nightly. Disp:  Rfl:    ezetimibe (ZETIA) 10 MG Oral Tab Take 5 mg by mouth daily.  1/2 tab daily  Disp:  Rfl:    Rosuvastatin Calcium (CRESTOR) 40 MG Oral Tab Take one daily Disp:

## 2019-10-07 ENCOUNTER — OFFICE VISIT (OUTPATIENT)
Dept: PULMONOLOGY | Facility: CLINIC | Age: 84
End: 2019-10-07
Payer: MEDICARE

## 2019-10-07 VITALS
SYSTOLIC BLOOD PRESSURE: 88 MMHG | BODY MASS INDEX: 32.1 KG/M2 | HEART RATE: 90 BPM | DIASTOLIC BLOOD PRESSURE: 57 MMHG | WEIGHT: 237 LBS | OXYGEN SATURATION: 87 % | HEIGHT: 72 IN

## 2019-10-07 DIAGNOSIS — J43.1 PANLOBULAR EMPHYSEMA (HCC): Primary | ICD-10-CM

## 2019-10-07 PROCEDURE — 99213 OFFICE O/P EST LOW 20 MIN: CPT | Performed by: INTERNAL MEDICINE

## 2019-10-07 PROCEDURE — G0463 HOSPITAL OUTPT CLINIC VISIT: HCPCS | Performed by: INTERNAL MEDICINE

## 2019-10-07 RX ORDER — AZITHROMYCIN 250 MG/1
TABLET, FILM COATED ORAL
Qty: 6 TABLET | Refills: 0 | Status: SHIPPED | OUTPATIENT
Start: 2019-10-07 | End: 2019-10-14

## 2019-10-07 RX ORDER — PREDNISONE 20 MG/1
TABLET ORAL
Qty: 10 TABLET | Refills: 0 | Status: SHIPPED | OUTPATIENT
Start: 2019-10-07 | End: 2019-10-14

## 2019-10-07 RX ORDER — TETRAHYDROZOLINE HCL 0.05 %
1 DROPS OPHTHALMIC (EYE) 4 TIMES DAILY PRN
COMMUNITY
End: 2021-01-13

## 2019-10-07 NOTE — PROGRESS NOTES
The patient is an 82-year-old male who I know well from prior evaluation comes in now noting that his breathing is little bit worse with ambulation and this is limiting him. He does have a cough with some yellow phlegm production.     Review of Systems:  V

## 2019-10-21 ENCOUNTER — TELEPHONE (OUTPATIENT)
Dept: PULMONOLOGY | Facility: CLINIC | Age: 84
End: 2019-10-21

## 2019-10-21 DIAGNOSIS — J44.9 CHRONIC OBSTRUCTIVE PULMONARY DISEASE, UNSPECIFIED COPD TYPE (HCC): Primary | ICD-10-CM

## 2019-10-21 NOTE — TELEPHONE ENCOUNTER
Leilani Trivedi states pt is requesting new portable oxygen concentrator pulse dose setting 3-5. Dr. Bret Spurling, okay to order?

## 2019-10-21 NOTE — TELEPHONE ENCOUNTER
[facility] called to verify oxygen use for pt. Pt is trying to update his portable oxygen concentrator. Please fax last office notes or complete fax that was sent to this office this morning. Please fax to [phone].

## 2019-10-22 NOTE — TELEPHONE ENCOUNTER
Order and faxed order signed by Dr. Elisa Sood faxed to Mayra Saucedo at [facility] m-[phone]. Fax confirmation received. Signed order sent to HIM for scanning.

## 2019-11-11 ENCOUNTER — TELEPHONE (OUTPATIENT)
Dept: INTERNAL MEDICINE CLINIC | Facility: CLINIC | Age: 84
End: 2019-11-11

## 2019-11-11 RX ORDER — TRAMADOL HYDROCHLORIDE 50 MG/1
50 TABLET ORAL EVERY 12 HOURS PRN
Qty: 90 TABLET | Refills: 0 | Status: SHIPPED | OUTPATIENT
Start: 2019-11-11 | End: 2021-03-22

## 2019-11-11 NOTE — TELEPHONE ENCOUNTER
To MD:  The above refill request is for a controlled substance. Please review pended medication order. Print and sign for staff to fax to pharmacy or prescribe electronically.

## 2019-11-30 NOTE — H&P
[address] Gastroenterology                                                                                                  Clinic History and Physical     Pa Diagnosis Date   • Asthma    • Blindness of left eye    • CAD (coronary artery disease) 2005    double bypass   • COPD (chronic obstructive pulmonary disease) ([facility])     Home oxygen 2 liters night   • Eye infection 2012    Strep infxn L eye after inj for rarely    Drug use: No       Medications (Active prior to today's visit):  Current Outpatient Medications   Medication Sig Dispense Refill   • hydrocortisone 2.5 % Rectal Cream Place 1 Application rectally 2 (two) times daily for 7 days.  Apply by topical r HFA (PROAIR HFA) 108 (90 BASE) MCG/ACT Inhalation Aero Soln Inhale 2 puffs into the lungs as needed.            Allergies:    Mold                      Spironolactone          OTHER (SEE COMMENTS)    Comment:Breast secrete milk  Vytorin [Ezetimibe-*    OTHE and oriented x4, and patient is having movements of all 4 extremities   Psych: Pt has a normal mood and affect, behavior is normal    Nursing note and vitals reviewed    Labs/Imaging:     Patient's labs and imaging were reviewed and discussed with patient APN  12/2/2019

## 2019-12-03 ENCOUNTER — OFFICE VISIT (OUTPATIENT)
Dept: GASTROENTEROLOGY | Facility: CLINIC | Age: 84
End: 2019-12-03
Payer: MEDICARE

## 2019-12-03 VITALS
BODY MASS INDEX: 31.8 KG/M2 | HEART RATE: 96 BPM | HEIGHT: 72 IN | WEIGHT: 234.81 LBS | SYSTOLIC BLOOD PRESSURE: 91 MMHG | DIASTOLIC BLOOD PRESSURE: 50 MMHG

## 2019-12-03 DIAGNOSIS — Z12.11 SCREENING FOR COLON CANCER: ICD-10-CM

## 2019-12-03 DIAGNOSIS — Z80.0 FAMILY HISTORY OF COLON CANCER: ICD-10-CM

## 2019-12-03 DIAGNOSIS — K64.5 EXTERNAL THROMBOSED HEMORRHOIDS: Primary | ICD-10-CM

## 2019-12-03 PROCEDURE — G0463 HOSPITAL OUTPT CLINIC VISIT: HCPCS | Performed by: NURSE PRACTITIONER

## 2019-12-03 PROCEDURE — 99203 OFFICE O/P NEW LOW 30 MIN: CPT | Performed by: NURSE PRACTITIONER

## 2020-03-06 ENCOUNTER — TELEPHONE (OUTPATIENT)
Dept: INTERNAL MEDICINE CLINIC | Facility: CLINIC | Age: 85
End: 2020-03-06

## 2020-03-06 NOTE — TELEPHONE ENCOUNTER
To nursing, please tell patient I received a fax of his lab done by Dr. Jarod Lyn in [place] 2/19/20. His liver enzymes are a little elevated.     He should follow Dr. Maricruz Garcia recommendations regarding this in view of her ordering the lab and s

## 2020-03-18 NOTE — TELEPHONE ENCOUNTER
Called patient per IRISH gave message to patients wife. She expressed understanding. Cancelling upciming April appointment as they are staying in [place] til some time in June. n

## 2020-07-06 ENCOUNTER — TELEPHONE (OUTPATIENT)
Dept: PULMONOLOGY | Facility: CLINIC | Age: 85
End: 2020-07-06

## 2020-07-06 NOTE — TELEPHONE ENCOUNTER
Pt's wife states she had to cancel his appt on 7-20 because they will be coming home from July and would like to see him sooner then 9-28 when I was able to schedule him.  Please advise

## 2020-07-07 NOTE — TELEPHONE ENCOUNTER
Called and spoke with pt's wife regarding appt request below with dr Rodriguez. Appt given on 08/24/2020 at 4:30 in [facility]. Pt voiced understanding and has not further questions.

## 2020-09-28 ENCOUNTER — OFFICE VISIT (OUTPATIENT)
Dept: PULMONOLOGY | Facility: CLINIC | Age: 85
End: 2020-09-28
Payer: MEDICARE

## 2020-09-28 VITALS
RESPIRATION RATE: 18 BRPM | HEART RATE: 57 BPM | DIASTOLIC BLOOD PRESSURE: 63 MMHG | WEIGHT: 233 LBS | BODY MASS INDEX: 32 KG/M2 | OXYGEN SATURATION: 88 % | SYSTOLIC BLOOD PRESSURE: 95 MMHG

## 2020-09-28 DIAGNOSIS — J43.1 PANLOBULAR EMPHYSEMA (HCC): Primary | ICD-10-CM

## 2020-09-28 PROCEDURE — 99213 OFFICE O/P EST LOW 20 MIN: CPT | Performed by: INTERNAL MEDICINE

## 2020-09-28 PROCEDURE — G0463 HOSPITAL OUTPT CLINIC VISIT: HCPCS | Performed by: INTERNAL MEDICINE

## 2020-09-28 PROCEDURE — G0008 ADMIN INFLUENZA VIRUS VAC: HCPCS | Performed by: INTERNAL MEDICINE

## 2020-09-28 PROCEDURE — 90662 IIV NO PRSV INCREASED AG IM: CPT | Performed by: INTERNAL MEDICINE

## 2020-09-28 RX ORDER — FLUTICASONE FUROATE, UMECLIDINIUM BROMIDE AND VILANTEROL TRIFENATATE 100; 62.5; 25 UG/1; UG/1; UG/1
1 POWDER RESPIRATORY (INHALATION) DAILY
Qty: 6 EACH | Refills: 6 | Status: SHIPPED | OUTPATIENT
Start: 2020-09-28 | End: 2021-12-06

## 2020-09-28 RX ORDER — PREDNISONE 20 MG/1
TABLET ORAL
Qty: 10 TABLET | Refills: 0 | Status: SHIPPED | OUTPATIENT
Start: 2020-09-28 | End: 2021-01-13

## 2020-09-28 RX ORDER — AZITHROMYCIN 250 MG/1
TABLET, FILM COATED ORAL
Qty: 6 TABLET | Refills: 0 | Status: SHIPPED | OUTPATIENT
Start: 2020-09-28 | End: 2020-10-03

## 2020-09-28 NOTE — PROGRESS NOTES
The patient is an 24-year-old male who I know well from prior evaluation comes in now for follow-up. He has Incruse Ellipta and Breo Ellipta. We discussed transitioning to Trelegy. Additionally, he would like a flu shot.   He has cough with greenish phle

## 2020-12-08 ENCOUNTER — TELEPHONE (OUTPATIENT)
Dept: INTERNAL MEDICINE CLINIC | Facility: CLINIC | Age: 85
End: 2020-12-08

## 2020-12-08 DIAGNOSIS — I10 ESSENTIAL HYPERTENSION: Primary | ICD-10-CM

## 2020-12-08 NOTE — TELEPHONE ENCOUNTER
Medicare annual scheduled with Dr Luci Bull on   Jan 13, 2021    Requests orders for lab/urine test to be done prior to appt     Please call to confirm [phone]

## 2020-12-09 NOTE — TELEPHONE ENCOUNTER
Spoke with Narendra Zita Gonzalesdict to inform her lab orders are in place, should be done fasting and an appt should be scheduled prior to upcoming appt.

## 2020-12-09 NOTE — TELEPHONE ENCOUNTER
To nursing, please tell patient lab orders are in the system. Thanks. 1. Essential hypertension    - COMP METABOLIC PANEL (14); Future  - CBC WITH DIFFERENTIAL WITH PLATELET; Future  - LIPID PANEL;  Future  - URINALYSIS WITH CULTURE REFLEX  - TSH W REFL

## 2021-01-06 ENCOUNTER — LAB ENCOUNTER (OUTPATIENT)
Dept: LAB | Age: 86
End: 2021-01-06
Attending: INTERNAL MEDICINE
Payer: MEDICARE

## 2021-01-06 DIAGNOSIS — I10 ESSENTIAL HYPERTENSION: ICD-10-CM

## 2021-01-06 LAB
ALBUMIN SERPL-MCNC: 3.6 G/DL (ref 3.4–5)
ALBUMIN/GLOB SERPL: 0.9 {RATIO} (ref 1–2)
ALP LIVER SERPL-CCNC: 87 U/L
ALT SERPL-CCNC: 50 U/L
ANION GAP SERPL CALC-SCNC: 7 MMOL/L (ref 0–18)
AST SERPL-CCNC: 27 U/L (ref 15–37)
BASOPHILS # BLD AUTO: 0.08 X10(3) UL (ref 0–0.2)
BASOPHILS NFR BLD AUTO: 0.7 %
BILIRUB SERPL-MCNC: 0.8 MG/DL (ref 0.1–2)
BILIRUB UR QL: NEGATIVE
BUN BLD-MCNC: 18 MG/DL (ref 7–18)
BUN/CREAT SERPL: 13.1 (ref 10–20)
CALCIUM BLD-MCNC: 10.1 MG/DL (ref 8.5–10.1)
CHLORIDE SERPL-SCNC: 109 MMOL/L (ref 98–112)
CHOLEST SMN-MCNC: 151 MG/DL (ref ?–200)
CLARITY UR: CLEAR
CO2 SERPL-SCNC: 24 MMOL/L (ref 21–32)
COLOR UR: YELLOW
CREAT BLD-MCNC: 1.37 MG/DL
DEPRECATED RDW RBC AUTO: 55.9 FL (ref 35.1–46.3)
EOSINOPHIL # BLD AUTO: 0.55 X10(3) UL (ref 0–0.7)
EOSINOPHIL NFR BLD AUTO: 4.6 %
ERYTHROCYTE [DISTWIDTH] IN BLOOD BY AUTOMATED COUNT: 16.3 % (ref 11–15)
GLOBULIN PLAS-MCNC: 4 G/DL (ref 2.8–4.4)
GLUCOSE BLD-MCNC: 124 MG/DL (ref 70–99)
GLUCOSE UR-MCNC: NEGATIVE MG/DL
HCT VFR BLD AUTO: 54 %
HDLC SERPL-MCNC: 53 MG/DL (ref 40–59)
HGB BLD-MCNC: 17.8 G/DL
HGB UR QL STRIP.AUTO: NEGATIVE
HYALINE CASTS #/AREA URNS AUTO: 3 /LPF
IMM GRANULOCYTES # BLD AUTO: 0.05 X10(3) UL (ref 0–1)
IMM GRANULOCYTES NFR BLD: 0.4 %
KETONES UR-MCNC: NEGATIVE MG/DL
LDLC SERPL CALC-MCNC: 75 MG/DL (ref ?–100)
LEUKOCYTE ESTERASE UR QL STRIP.AUTO: NEGATIVE
LYMPHOCYTES # BLD AUTO: 3.44 X10(3) UL (ref 1–4)
LYMPHOCYTES NFR BLD AUTO: 28.5 %
M PROTEIN MFR SERPL ELPH: 7.6 G/DL (ref 6.4–8.2)
MCH RBC QN AUTO: 31.2 PG (ref 26–34)
MCHC RBC AUTO-ENTMCNC: 33 G/DL (ref 31–37)
MCV RBC AUTO: 94.6 FL
MONOCYTES # BLD AUTO: 1.14 X10(3) UL (ref 0.1–1)
MONOCYTES NFR BLD AUTO: 9.4 %
NEUTROPHILS # BLD AUTO: 6.81 X10 (3) UL (ref 1.5–7.7)
NEUTROPHILS # BLD AUTO: 6.81 X10(3) UL (ref 1.5–7.7)
NEUTROPHILS NFR BLD AUTO: 56.4 %
NITRITE UR QL STRIP.AUTO: NEGATIVE
NONHDLC SERPL-MCNC: 98 MG/DL (ref ?–130)
OSMOLALITY SERPL CALC.SUM OF ELEC: 293 MOSM/KG (ref 275–295)
PATIENT FASTING Y/N/NP: YES
PATIENT FASTING Y/N/NP: YES
PH UR: 5 [PH] (ref 5–8)
PLATELET # BLD AUTO: 159 10(3)UL (ref 150–450)
POTASSIUM SERPL-SCNC: 4.1 MMOL/L (ref 3.5–5.1)
PROT UR-MCNC: 100 MG/DL
RBC # BLD AUTO: 5.71 X10(6)UL
RBC #/AREA URNS AUTO: 1 /HPF
SODIUM SERPL-SCNC: 140 MMOL/L (ref 136–145)
SP GR UR STRIP: 1.03 (ref 1–1.03)
TRIGL SERPL-MCNC: 117 MG/DL (ref 30–149)
TSI SER-ACNC: 1.87 MIU/ML (ref 0.36–3.74)
UROBILINOGEN UR STRIP-ACNC: 2
VLDLC SERPL CALC-MCNC: 23 MG/DL (ref 0–30)
WBC # BLD AUTO: 12.1 X10(3) UL (ref 4–11)
WBC #/AREA URNS AUTO: 3 /HPF

## 2021-01-06 PROCEDURE — 81001 URINALYSIS AUTO W/SCOPE: CPT | Performed by: INTERNAL MEDICINE

## 2021-01-06 PROCEDURE — 80053 COMPREHEN METABOLIC PANEL: CPT

## 2021-01-06 PROCEDURE — 84443 ASSAY THYROID STIM HORMONE: CPT

## 2021-01-06 PROCEDURE — 85025 COMPLETE CBC W/AUTO DIFF WBC: CPT

## 2021-01-06 PROCEDURE — 36415 COLL VENOUS BLD VENIPUNCTURE: CPT

## 2021-01-06 PROCEDURE — 80061 LIPID PANEL: CPT

## 2021-01-13 ENCOUNTER — OFFICE VISIT (OUTPATIENT)
Dept: INTERNAL MEDICINE CLINIC | Facility: CLINIC | Age: 86
End: 2021-01-13
Payer: MEDICARE

## 2021-01-13 VITALS
BODY MASS INDEX: 34 KG/M2 | WEIGHT: 248 LBS | HEART RATE: 72 BPM | OXYGEN SATURATION: 97 % | TEMPERATURE: 98 F | SYSTOLIC BLOOD PRESSURE: 112 MMHG | DIASTOLIC BLOOD PRESSURE: 60 MMHG

## 2021-01-13 DIAGNOSIS — I25.9 ISCHEMIC HEART DISEASE: ICD-10-CM

## 2021-01-13 DIAGNOSIS — J44.9 CHRONIC OBSTRUCTIVE PULMONARY DISEASE, UNSPECIFIED COPD TYPE (HCC): ICD-10-CM

## 2021-01-13 DIAGNOSIS — I10 HYPERTENSION, ESSENTIAL: ICD-10-CM

## 2021-01-13 DIAGNOSIS — Z00.00 MEDICARE ANNUAL WELLNESS VISIT, SUBSEQUENT: Primary | ICD-10-CM

## 2021-01-13 DIAGNOSIS — E78.00 HYPERCHOLESTEROLEMIA: ICD-10-CM

## 2021-01-13 DIAGNOSIS — I25.5 ISCHEMIC CARDIOMYOPATHY: ICD-10-CM

## 2021-01-13 PROCEDURE — G0439 PPPS, SUBSEQ VISIT: HCPCS | Performed by: INTERNAL MEDICINE

## 2021-01-13 PROCEDURE — 99213 OFFICE O/P EST LOW 20 MIN: CPT | Performed by: INTERNAL MEDICINE

## 2021-01-13 NOTE — PROGRESS NOTES
Maggy Kwong is a 82year old male who presents for     Here with his wife. Shakira Rashard seen 10/1/19  Medicare annual    COPD--Has chronic ADAMS-gradually worsened over the last yr. Is now SOB after walking 20 steps without oxygen, SOB walking 100 ft w oxygen. • ELECTROCARDIOGRAM, COMPLETE  12-    Scanned to media tab 12-   • ELECTROCARDIOGRAM, COMPLETE  01-    Scanned to media tab 01-   • EYE SURGERY  2012    Eye surgery for strep infection      Family History   Problem Relation Ag kg)      General: appears well nourished and in no acute distress--wearing oxygen--winded with activity  Neck: no adenopathy, thyr abn or carotid bruits  Lungs: clear--not coughing.    Heart: regular rhythm, S1S2 normal without murmur  Abdomen: soft, nonten ok.   6/27/18-BMP-ok.   2/19/20–CMP lipid panel by Dr. Tod Holman in [place]–creat 1.45, GFR 46, AST 70 , LDL 68  1/6/21–cbc cmp tsh lipid ua--Hgb 17.8 creat 1.37 GFR 47,  protein.      Ret in 6 months.      Patient and his wife express underst MCG/ACT Inhalation Aero Soln Inhale 2 puffs into the lungs as needed. • Fluticasone-Umeclidin-Vilant (TRELEGY ELLIPTA) 100-62.5-25 MCG/INH Inhalation Aerosol Powder, Breath Activated Inhale 1 puff into the lungs daily.  6 each 6         Manuela Ebbs is it?: Correct    Recall \"Ball\": Correct    Recall \"Flag\": Correct    Recall \"Tree\": Correct

## 2021-01-27 DIAGNOSIS — Z23 NEED FOR VACCINATION: ICD-10-CM

## 2021-03-22 ENCOUNTER — TELEPHONE (OUTPATIENT)
Dept: INTERNAL MEDICINE CLINIC | Facility: CLINIC | Age: 86
End: 2021-03-22

## 2021-03-22 RX ORDER — TRAMADOL HYDROCHLORIDE 50 MG/1
50 TABLET ORAL EVERY 12 HOURS PRN
Qty: 90 TABLET | Refills: 0 | Status: SHIPPED | OUTPATIENT
Start: 2021-03-22 | End: 2021-05-10

## 2021-03-22 NOTE — TELEPHONE ENCOUNTER
To MD:  The above refill request is for a controlled substance. Please review pended medication order. Print and sign for staff to fax to pharmacy or prescribe electronically.     Last office visit: 1/13/2021   Last time refill sent and quantity/refills:

## 2021-05-08 ENCOUNTER — TELEPHONE (OUTPATIENT)
Dept: INTERNAL MEDICINE CLINIC | Facility: CLINIC | Age: 86
End: 2021-05-08

## 2021-05-10 RX ORDER — TRAMADOL HYDROCHLORIDE 50 MG/1
TABLET ORAL
Qty: 90 TABLET | Refills: 2 | Status: SHIPPED | OUTPATIENT
Start: 2021-05-10 | End: 2021-12-20

## 2021-09-27 ENCOUNTER — OFFICE VISIT (OUTPATIENT)
Dept: PULMONOLOGY | Facility: CLINIC | Age: 86
End: 2021-09-27
Payer: MEDICARE

## 2021-09-27 VITALS — OXYGEN SATURATION: 92 % | SYSTOLIC BLOOD PRESSURE: 90 MMHG | HEART RATE: 80 BPM | DIASTOLIC BLOOD PRESSURE: 52 MMHG

## 2021-09-27 DIAGNOSIS — J43.1 PANLOBULAR EMPHYSEMA (HCC): Primary | ICD-10-CM

## 2021-09-27 PROCEDURE — 99213 OFFICE O/P EST LOW 20 MIN: CPT | Performed by: INTERNAL MEDICINE

## 2021-09-27 RX ORDER — ALBUTEROL SULFATE 90 UG/1
2 AEROSOL, METERED RESPIRATORY (INHALATION) AS NEEDED
Qty: 24 G | Refills: 0 | Status: SHIPPED | OUTPATIENT
Start: 2021-09-27

## 2021-09-27 NOTE — PROGRESS NOTES
Patient is an 43-year-old male who [place] from prior evaluation who comes in now for follow-up. In general, he is doing well. His breathing is characterized by desaturations when he takes off his supplemental oxygen and ambulates to the restroom.     R

## 2021-10-18 ENCOUNTER — OFFICE VISIT (OUTPATIENT)
Dept: INTERNAL MEDICINE CLINIC | Facility: CLINIC | Age: 86
End: 2021-10-18
Payer: MEDICARE

## 2021-10-18 VITALS
HEART RATE: 67 BPM | SYSTOLIC BLOOD PRESSURE: 90 MMHG | OXYGEN SATURATION: 94 % | TEMPERATURE: 98 F | HEIGHT: 72 IN | BODY MASS INDEX: 32.64 KG/M2 | DIASTOLIC BLOOD PRESSURE: 50 MMHG | WEIGHT: 241 LBS

## 2021-10-18 DIAGNOSIS — I25.9 ISCHEMIC HEART DISEASE: ICD-10-CM

## 2021-10-18 DIAGNOSIS — I25.5 ISCHEMIC CARDIOMYOPATHY: ICD-10-CM

## 2021-10-18 DIAGNOSIS — E78.00 HYPERCHOLESTEROLEMIA: ICD-10-CM

## 2021-10-18 DIAGNOSIS — N64.4 BREAST PAIN: Primary | ICD-10-CM

## 2021-10-18 DIAGNOSIS — J44.9 CHRONIC OBSTRUCTIVE PULMONARY DISEASE, UNSPECIFIED COPD TYPE (HCC): ICD-10-CM

## 2021-10-18 DIAGNOSIS — I10 HYPERTENSION, ESSENTIAL: ICD-10-CM

## 2021-10-18 PROCEDURE — G0008 ADMIN INFLUENZA VIRUS VAC: HCPCS | Performed by: INTERNAL MEDICINE

## 2021-10-18 PROCEDURE — 99214 OFFICE O/P EST MOD 30 MIN: CPT | Performed by: INTERNAL MEDICINE

## 2021-10-18 PROCEDURE — 90662 IIV NO PRSV INCREASED AG IM: CPT | Performed by: INTERNAL MEDICINE

## 2021-10-18 NOTE — PROGRESS NOTES
Joy Curtis is a 82year old male who presents for     Here with his wife.    Check at 9 mo. Had 2nd covid vacc 3/6/21 and pt feels after the 2nd vaccine, he got bilateral breast tenderness. No enlargement of breasts or lumps.    \"It is better the 12-    Scanned to media tab 12-   • ELECTROCARDIOGRAM, COMPLETE  12-    Scanned to media tab 12-   • ELECTROCARDIOGRAM, COMPLETE  01-    Scanned to media tab 01-   • EYE SURGERY  2012    Eye surgery for strep infec gynecomastia L >R.   no tenderness exc when pressing against L nipple. No nipple drainage. No lumps. No axillary nodes.   Lungs: good air entry bilaterally  Heart: regular rhythm, S1S2 normal without murmur  Abdomen: soft, nontender without mass or hepatos    Dr Brenna Heredia checks prostate. Hx prostate cancer in 2000. In past, had PSA in [place] urologist Dr Divya Myers.     Lab 1/24/18-(fax rec'd)-cbc cmp tsh lipid ua psa from Dr Laila Neville in [place]- results ok.   6/27/18-BMP-ok.   2/19/20–CMP lipid panel by Dr. Alden Singleton Oral Tab Take 1 tablet by mouth daily. 1 tablet 0   • Levalbuterol HCl 0.31 MG/3ML Inhalation Nebu Soln Take 3 mL by nebulization every 4 (four) hours as needed for Wheezing. 1 each 0   • aspirin 81 MG Oral Tab Take 81 mg by mouth nightly.        • Budesoni

## 2021-12-06 RX ORDER — FLUTICASONE FUROATE, UMECLIDINIUM BROMIDE AND VILANTEROL TRIFENATATE 100; 62.5; 25 UG/1; UG/1; UG/1
POWDER RESPIRATORY (INHALATION)
Qty: 6 EACH | Refills: 6 | Status: SHIPPED | OUTPATIENT
Start: 2021-12-06

## 2021-12-06 NOTE — TELEPHONE ENCOUNTER
LOV: 9/27/2021  Last refill: 9/28/2020    Dr. Mikal Dockery review and sign pended order if agreeable.

## 2021-12-20 ENCOUNTER — TELEPHONE (OUTPATIENT)
Dept: INTERNAL MEDICINE CLINIC | Facility: CLINIC | Age: 86
End: 2021-12-20

## 2021-12-20 RX ORDER — TRAMADOL HYDROCHLORIDE 50 MG/1
TABLET ORAL
Qty: 90 TABLET | Refills: 2 | Status: SHIPPED | OUTPATIENT
Start: 2021-12-20

## 2021-12-20 NOTE — TELEPHONE ENCOUNTER
To MD:  The above refill request is for a controlled substance. Please review pended medication order. Print and sign for staff to fax to pharmacy or prescribe electronically.     Last office visit: 10/18/2021  Last time refill sent and quantity/refills:

## 2022-01-01 ENCOUNTER — HOSPITAL ENCOUNTER (INPATIENT)
Facility: HOSPITAL | Age: 87
LOS: 1 days | End: 2022-01-01
Attending: EMERGENCY MEDICINE | Admitting: INTERNAL MEDICINE
Payer: MEDICARE

## 2022-01-01 ENCOUNTER — APPOINTMENT (OUTPATIENT)
Dept: ULTRASOUND IMAGING | Facility: HOSPITAL | Age: 87
End: 2022-01-01
Attending: INTERNAL MEDICINE
Payer: MEDICARE

## 2022-01-01 ENCOUNTER — APPOINTMENT (OUTPATIENT)
Dept: GENERAL RADIOLOGY | Facility: HOSPITAL | Age: 87
End: 2022-01-01
Payer: MEDICARE

## 2022-01-01 VITALS
SYSTOLIC BLOOD PRESSURE: 78 MMHG | DIASTOLIC BLOOD PRESSURE: 58 MMHG | TEMPERATURE: 97 F | OXYGEN SATURATION: 64 % | BODY MASS INDEX: 29.21 KG/M2 | HEIGHT: 72 IN | HEART RATE: 76 BPM | WEIGHT: 215.63 LBS | RESPIRATION RATE: 14 BRPM

## 2022-01-01 DIAGNOSIS — R06.03 RESPIRATORY DISTRESS: ICD-10-CM

## 2022-01-01 DIAGNOSIS — D72.829 LEUKOCYTOSIS, UNSPECIFIED TYPE: ICD-10-CM

## 2022-01-01 DIAGNOSIS — J69.0 ASPIRATION PNEUMONIA DUE TO INHALATION OF VOMITUS (HCC): ICD-10-CM

## 2022-01-01 DIAGNOSIS — N17.9 ACUTE KIDNEY INJURY (HCC): Primary | ICD-10-CM

## 2022-01-01 LAB
ANION GAP SERPL CALC-SCNC: 7 MMOL/L (ref 0–18)
ATRIAL RATE: 112 BPM
ATRIAL RATE: 114 BPM
BASE EXCESS BLD CALC-SCNC: -2.8 MMOL/L (ref ?–2)
BASOPHILS # BLD: 0 X10(3) UL (ref 0–0.2)
BASOPHILS NFR BLD: 0 %
BILIRUB UR QL CFM: NEGATIVE
BLOOD GAS EPAP: 5 CM H2O
BLOOD GAS IPAP: 12 CM H2O
BUN BLD-MCNC: 52 MG/DL (ref 7–18)
BUN/CREAT SERPL: 20.8 (ref 10–20)
CALCIUM BLD-MCNC: 9.6 MG/DL (ref 8.5–10.1)
CHLORIDE SERPL-SCNC: 107 MMOL/L (ref 98–112)
CLARITY UR: CLEAR
CO2 SERPL-SCNC: 26 MMOL/L (ref 21–32)
COLOR UR: YELLOW
CREAT BLD-MCNC: 2.5 MG/DL
D DIMER PPP FEU-MCNC: 0.86 UG/ML FEU (ref ?–0.85)
DEPRECATED RDW RBC AUTO: 61.1 FL (ref 35.1–46.3)
EOSINOPHIL # BLD: 0.64 X10(3) UL (ref 0–0.7)
EOSINOPHIL NFR BLD: 2 %
ERYTHROCYTE [DISTWIDTH] IN BLOOD BY AUTOMATED COUNT: 15.9 % (ref 11–15)
EST. AVERAGE GLUCOSE BLD GHB EST-MCNC: 114 MG/DL (ref 68–126)
FLUAV + FLUBV RNA SPEC NAA+PROBE: NEGATIVE
FLUAV + FLUBV RNA SPEC NAA+PROBE: NEGATIVE
GFR SERPLBLD BASED ON 1.73 SQ M-ARVRAT: 25 ML/MIN/1.73M2 (ref 60–?)
GLUCOSE BLD-MCNC: 265 MG/DL (ref 70–99)
GLUCOSE BLDC GLUCOMTR-MCNC: 145 MG/DL (ref 70–99)
GLUCOSE BLDC GLUCOMTR-MCNC: 188 MG/DL (ref 70–99)
GLUCOSE UR-MCNC: NEGATIVE MG/DL
HBA1C MFR BLD: 5.6 % (ref ?–5.7)
HCO3 BLDA-SCNC: 22.7 MEQ/L (ref 21–27)
HCT VFR BLD AUTO: 39.1 %
HGB BLD-MCNC: 12.5 G/DL
HYALINE CASTS #/AREA URNS AUTO: PRESENT /LPF
HYALINE CASTS #/AREA URNS AUTO: PRESENT /LPF
INR BLD: 1.18 (ref 0.85–1.16)
L PNEUMO AG UR QL: NEGATIVE
LACTATE SERPL-SCNC: 2.5 MMOL/L (ref 0.4–2)
LACTATE SERPL-SCNC: 5.4 MMOL/L (ref 0.4–2)
LEUKOCYTE ESTERASE UR QL STRIP.AUTO: NEGATIVE
LYMPHOCYTES NFR BLD: 12.08 X10(3) UL (ref 1–4)
LYMPHOCYTES NFR BLD: 38 %
MCH RBC QN AUTO: 33 PG (ref 26–34)
MCHC RBC AUTO-ENTMCNC: 32 G/DL (ref 31–37)
MCV RBC AUTO: 103.2 FL
MONOCYTES # BLD: 2.86 X10(3) UL (ref 0.1–1)
MONOCYTES NFR BLD: 9 %
NEUTROPHILS # BLD AUTO: 14.84 X10 (3) UL (ref 1.5–7.7)
NEUTROPHILS NFR BLD: 48 %
NEUTS BAND NFR BLD: 3 %
NEUTS HYPERSEG # BLD: 16.22 X10(3) UL (ref 1.5–7.7)
NITRITE UR QL STRIP.AUTO: NEGATIVE
NT-PROBNP SERPL-MCNC: 90 PG/ML (ref ?–450)
O2 CT BLD-SCNC: 19.2 VOL% (ref 15–23)
O2/TOTAL GAS SETTING VFR VENT: 60 %
OSMOLALITY SERPL CALC.SUM OF ELEC: 313 MOSM/KG (ref 275–295)
P AXIS: 81 DEGREES
P AXIS: 93 DEGREES
P-R INTERVAL: 214 MS
P-R INTERVAL: 242 MS
PCO2 BLDA: 62 MM HG (ref 35–45)
PH BLDA: 7.23 [PH] (ref 7.35–7.45)
PH UR: 5.5 [PH] (ref 5–8)
PLATELET # BLD AUTO: 229 10(3)UL (ref 150–450)
PLATELET MORPHOLOGY: NORMAL
PO2 BLDA: 183 MM HG (ref 80–100)
POTASSIUM SERPL-SCNC: 5.3 MMOL/L (ref 3.5–5.1)
PROCALCITONIN SERPL-MCNC: 0.07 NG/ML (ref ?–0.16)
PROT UR-MCNC: >=300 MG/DL
PROTHROMBIN TIME: 14.8 SECONDS (ref 11.6–14.8)
PUNCTURE CHARGE: YES
Q-T INTERVAL: 278 MS
Q-T INTERVAL: 282 MS
QRS DURATION: 76 MS
QRS DURATION: 78 MS
QTC CALCULATION (BEZET): 383 MS
QTC CALCULATION (BEZET): 384 MS
R AXIS: 100 DEGREES
R AXIS: 101 DEGREES
RBC # BLD AUTO: 3.79 X10(6)UL
RBC #/AREA URNS AUTO: >10 /HPF
RBC #/AREA URNS AUTO: >10 /HPF
RSV RNA SPEC NAA+PROBE: NEGATIVE
SAO2 % BLDA: >99 % (ref 94–100)
SARS-COV-2 RNA RESP QL NAA+PROBE: NOT DETECTED
SODIUM SERPL-SCNC: 140 MMOL/L (ref 136–145)
SP GR UR STRIP: 1.02 (ref 1–1.03)
STREP PNEUMO ANTIGEN, URINE: NEGATIVE
T AXIS: 101 DEGREES
T AXIS: 94 DEGREES
TOTAL CELLS COUNTED BLD: 100
TROPONIN I HIGH SENSITIVITY: 15 NG/L
UROBILINOGEN UR STRIP-ACNC: 0.2
VENTRICULAR RATE: 112 BPM
VENTRICULAR RATE: 114 BPM
WBC # BLD AUTO: 31.8 X10(3) UL (ref 4–11)

## 2022-01-01 PROCEDURE — 93970 EXTREMITY STUDY: CPT | Performed by: INTERNAL MEDICINE

## 2022-01-01 PROCEDURE — 99223 1ST HOSP IP/OBS HIGH 75: CPT | Performed by: HOSPITALIST

## 2022-01-01 PROCEDURE — 76770 US EXAM ABDO BACK WALL COMP: CPT | Performed by: INTERNAL MEDICINE

## 2022-01-01 PROCEDURE — 71045 X-RAY EXAM CHEST 1 VIEW: CPT | Performed by: EMERGENCY MEDICINE

## 2022-01-01 PROCEDURE — 99291 CRITICAL CARE FIRST HOUR: CPT | Performed by: INTERNAL MEDICINE

## 2022-01-01 RX ORDER — NICOTINE POLACRILEX 4 MG
15 LOZENGE BUCCAL
Status: DISCONTINUED | OUTPATIENT
Start: 2022-01-01 | End: 2022-12-08

## 2022-01-01 RX ORDER — NICOTINE POLACRILEX 4 MG
30 LOZENGE BUCCAL
Status: DISCONTINUED | OUTPATIENT
Start: 2022-01-01 | End: 2022-12-08

## 2022-01-01 RX ORDER — IPRATROPIUM BROMIDE AND ALBUTEROL SULFATE 2.5; .5 MG/3ML; MG/3ML
3 SOLUTION RESPIRATORY (INHALATION)
Status: DISCONTINUED | OUTPATIENT
Start: 2022-01-01 | End: 2022-12-08

## 2022-01-01 RX ORDER — HEPARIN SODIUM 5000 [USP'U]/ML
5000 INJECTION, SOLUTION INTRAVENOUS; SUBCUTANEOUS EVERY 12 HOURS
Status: DISCONTINUED | OUTPATIENT
Start: 2022-01-01 | End: 2022-12-08

## 2022-01-01 RX ORDER — DEXTROSE MONOHYDRATE 25 G/50ML
50 INJECTION, SOLUTION INTRAVENOUS
Status: DISCONTINUED | OUTPATIENT
Start: 2022-01-01 | End: 2022-12-08

## 2022-01-01 RX ORDER — SODIUM CHLORIDE 9 MG/ML
INJECTION, SOLUTION INTRAVENOUS CONTINUOUS
Status: DISCONTINUED | OUTPATIENT
Start: 2022-01-01 | End: 2022-12-08

## 2022-01-01 RX ORDER — ONDANSETRON 2 MG/ML
4 INJECTION INTRAMUSCULAR; INTRAVENOUS EVERY 6 HOURS PRN
Status: DISCONTINUED | OUTPATIENT
Start: 2022-01-01 | End: 2022-12-08

## 2022-01-01 RX ORDER — ACETAMINOPHEN 325 MG/1
650 TABLET ORAL EVERY 6 HOURS PRN
Status: DISCONTINUED | OUTPATIENT
Start: 2022-01-01 | End: 2022-12-08

## 2022-05-24 ENCOUNTER — OFFICE VISIT (OUTPATIENT)
Dept: PULMONOLOGY | Facility: CLINIC | Age: 87
End: 2022-05-24
Payer: MEDICARE

## 2022-05-24 VITALS
DIASTOLIC BLOOD PRESSURE: 59 MMHG | RESPIRATION RATE: 16 BRPM | HEART RATE: 69 BPM | BODY MASS INDEX: 33 KG/M2 | HEIGHT: 72 IN | OXYGEN SATURATION: 92 % | SYSTOLIC BLOOD PRESSURE: 87 MMHG

## 2022-05-24 DIAGNOSIS — J43.1 PANLOBULAR EMPHYSEMA (HCC): Primary | ICD-10-CM

## 2022-05-24 DIAGNOSIS — D75.1 POLYCYTHEMIA: ICD-10-CM

## 2022-05-24 PROCEDURE — 99213 OFFICE O/P EST LOW 20 MIN: CPT | Performed by: INTERNAL MEDICINE

## 2022-05-27 ENCOUNTER — LAB ENCOUNTER (OUTPATIENT)
Dept: LAB | Age: 87
End: 2022-05-27
Attending: INTERNAL MEDICINE
Payer: MEDICARE

## 2022-05-27 DIAGNOSIS — D75.1 POLYCYTHEMIA: ICD-10-CM

## 2022-05-27 LAB
BASOPHILS # BLD AUTO: 0.05 X10(3) UL (ref 0–0.2)
BASOPHILS NFR BLD AUTO: 0.4 %
DEPRECATED RDW RBC AUTO: 59.3 FL (ref 35.1–46.3)
EOSINOPHIL # BLD AUTO: 0.3 X10(3) UL (ref 0–0.7)
EOSINOPHIL NFR BLD AUTO: 2.5 %
ERYTHROCYTE [DISTWIDTH] IN BLOOD BY AUTOMATED COUNT: 16.2 % (ref 11–15)
HCT VFR BLD AUTO: 47.6 %
HGB BLD-MCNC: 15.3 G/DL
IMM GRANULOCYTES # BLD AUTO: 0.06 X10(3) UL (ref 0–1)
IMM GRANULOCYTES NFR BLD: 0.5 %
LYMPHOCYTES # BLD AUTO: 3.41 X10(3) UL (ref 1–4)
LYMPHOCYTES NFR BLD AUTO: 28.3 %
MCH RBC QN AUTO: 31.7 PG (ref 26–34)
MCHC RBC AUTO-ENTMCNC: 32.1 G/DL (ref 31–37)
MCV RBC AUTO: 98.8 FL
MONOCYTES # BLD AUTO: 0.77 X10(3) UL (ref 0.1–1)
MONOCYTES NFR BLD AUTO: 6.4 %
NEUTROPHILS # BLD AUTO: 7.46 X10 (3) UL (ref 1.5–7.7)
NEUTROPHILS # BLD AUTO: 7.46 X10(3) UL (ref 1.5–7.7)
NEUTROPHILS NFR BLD AUTO: 61.9 %
PLATELET # BLD AUTO: 209 10(3)UL (ref 150–450)
RBC # BLD AUTO: 4.82 X10(6)UL
WBC # BLD AUTO: 12.1 X10(3) UL (ref 4–11)

## 2022-05-27 PROCEDURE — 85025 COMPLETE CBC W/AUTO DIFF WBC: CPT

## 2022-05-27 PROCEDURE — 36415 COLL VENOUS BLD VENIPUNCTURE: CPT

## 2022-06-27 ENCOUNTER — TELEPHONE (OUTPATIENT)
Dept: INTERNAL MEDICINE CLINIC | Facility: CLINIC | Age: 87
End: 2022-06-27

## 2022-06-27 DIAGNOSIS — I10 ESSENTIAL HYPERTENSION: Primary | ICD-10-CM

## 2022-06-27 NOTE — TELEPHONE ENCOUNTER
Patient is calling today to request blood work orders be placed in the system for his medicare annual with Dr Adeel Beckett on 7/5/2022. Patient will be going to an [facility] location. Please call patient when orders are placed.   # [phone]

## 2022-06-28 ENCOUNTER — LAB ENCOUNTER (OUTPATIENT)
Dept: LAB | Age: 87
End: 2022-06-28
Attending: INTERNAL MEDICINE
Payer: MEDICARE

## 2022-06-28 DIAGNOSIS — I10 ESSENTIAL HYPERTENSION: ICD-10-CM

## 2022-06-28 LAB
ALBUMIN SERPL-MCNC: 3.3 G/DL (ref 3.4–5)
ALBUMIN/GLOB SERPL: 0.9 {RATIO} (ref 1–2)
ALP LIVER SERPL-CCNC: 73 U/L
ALT SERPL-CCNC: 61 U/L
ANION GAP SERPL CALC-SCNC: 9 MMOL/L (ref 0–18)
AST SERPL-CCNC: 33 U/L (ref 15–37)
BASOPHILS # BLD AUTO: 0.06 X10(3) UL (ref 0–0.2)
BASOPHILS NFR BLD AUTO: 0.5 %
BILIRUB SERPL-MCNC: 0.7 MG/DL (ref 0.1–2)
BILIRUB UR QL: NEGATIVE
BUN BLD-MCNC: 23 MG/DL (ref 7–18)
BUN/CREAT SERPL: 14.8 (ref 10–20)
CALCIUM BLD-MCNC: 9.8 MG/DL (ref 8.5–10.1)
CHLORIDE SERPL-SCNC: 105 MMOL/L (ref 98–112)
CHOLEST SERPL-MCNC: 131 MG/DL (ref ?–200)
CLARITY UR: CLEAR
CO2 SERPL-SCNC: 26 MMOL/L (ref 21–32)
COLOR UR: YELLOW
CREAT BLD-MCNC: 1.55 MG/DL
DEPRECATED RDW RBC AUTO: 56.6 FL (ref 35.1–46.3)
EOSINOPHIL # BLD AUTO: 0.34 X10(3) UL (ref 0–0.7)
EOSINOPHIL NFR BLD AUTO: 2.9 %
ERYTHROCYTE [DISTWIDTH] IN BLOOD BY AUTOMATED COUNT: 15.8 % (ref 11–15)
FASTING PATIENT LIPID ANSWER: YES
FASTING STATUS PATIENT QL REPORTED: YES
GLOBULIN PLAS-MCNC: 3.6 G/DL (ref 2.8–4.4)
GLUCOSE BLD-MCNC: 111 MG/DL (ref 70–99)
GLUCOSE UR-MCNC: NEGATIVE MG/DL
HCT VFR BLD AUTO: 47.6 %
HDLC SERPL-MCNC: 50 MG/DL (ref 40–59)
HGB BLD-MCNC: 15.2 G/DL
HGB UR QL STRIP.AUTO: NEGATIVE
IMM GRANULOCYTES # BLD AUTO: 0.05 X10(3) UL (ref 0–1)
IMM GRANULOCYTES NFR BLD: 0.4 %
KETONES UR-MCNC: NEGATIVE MG/DL
LDLC SERPL CALC-MCNC: 59 MG/DL (ref ?–100)
LEUKOCYTE ESTERASE UR QL STRIP.AUTO: NEGATIVE
LYMPHOCYTES # BLD AUTO: 3.04 X10(3) UL (ref 1–4)
LYMPHOCYTES NFR BLD AUTO: 26.2 %
MCH RBC QN AUTO: 31.3 PG (ref 26–34)
MCHC RBC AUTO-ENTMCNC: 31.9 G/DL (ref 31–37)
MCV RBC AUTO: 97.9 FL
MONOCYTES # BLD AUTO: 1 X10(3) UL (ref 0.1–1)
MONOCYTES NFR BLD AUTO: 8.6 %
NEUTROPHILS # BLD AUTO: 7.11 X10 (3) UL (ref 1.5–7.7)
NEUTROPHILS # BLD AUTO: 7.11 X10(3) UL (ref 1.5–7.7)
NEUTROPHILS NFR BLD AUTO: 61.4 %
NITRITE UR QL STRIP.AUTO: NEGATIVE
NONHDLC SERPL-MCNC: 81 MG/DL (ref ?–130)
OSMOLALITY SERPL CALC.SUM OF ELEC: 294 MOSM/KG (ref 275–295)
PH UR: 7 [PH] (ref 5–8)
PLATELET # BLD AUTO: 160 10(3)UL (ref 150–450)
POTASSIUM SERPL-SCNC: 4.6 MMOL/L (ref 3.5–5.1)
PROT SERPL-MCNC: 6.9 G/DL (ref 6.4–8.2)
RBC # BLD AUTO: 4.86 X10(6)UL
SODIUM SERPL-SCNC: 140 MMOL/L (ref 136–145)
SP GR UR STRIP: 1.02 (ref 1–1.03)
TRIGL SERPL-MCNC: 124 MG/DL (ref 30–149)
TSI SER-ACNC: 2.17 MIU/ML (ref 0.36–3.74)
UROBILINOGEN UR STRIP-ACNC: 0.2
VLDLC SERPL CALC-MCNC: 18 MG/DL (ref 0–30)
WBC # BLD AUTO: 11.6 X10(3) UL (ref 4–11)

## 2022-06-28 PROCEDURE — 85025 COMPLETE CBC W/AUTO DIFF WBC: CPT

## 2022-06-28 PROCEDURE — 80053 COMPREHEN METABOLIC PANEL: CPT

## 2022-06-28 PROCEDURE — 36415 COLL VENOUS BLD VENIPUNCTURE: CPT

## 2022-06-28 PROCEDURE — 84443 ASSAY THYROID STIM HORMONE: CPT

## 2022-06-28 PROCEDURE — 80061 LIPID PANEL: CPT

## 2022-07-05 ENCOUNTER — OFFICE VISIT (OUTPATIENT)
Dept: INTERNAL MEDICINE CLINIC | Facility: CLINIC | Age: 87
End: 2022-07-05
Payer: MEDICARE

## 2022-07-05 VITALS
DIASTOLIC BLOOD PRESSURE: 64 MMHG | HEIGHT: 72 IN | OXYGEN SATURATION: 93 % | WEIGHT: 248 LBS | TEMPERATURE: 98 F | BODY MASS INDEX: 33.59 KG/M2 | SYSTOLIC BLOOD PRESSURE: 112 MMHG | HEART RATE: 78 BPM

## 2022-07-05 DIAGNOSIS — I25.5 ISCHEMIC CARDIOMYOPATHY: ICD-10-CM

## 2022-07-05 DIAGNOSIS — J44.9 CHRONIC OBSTRUCTIVE PULMONARY DISEASE, UNSPECIFIED COPD TYPE (HCC): ICD-10-CM

## 2022-07-05 DIAGNOSIS — I10 HYPERTENSION, ESSENTIAL: ICD-10-CM

## 2022-07-05 DIAGNOSIS — E78.00 HYPERCHOLESTEROLEMIA: ICD-10-CM

## 2022-07-05 DIAGNOSIS — Z00.00 MEDICARE ANNUAL WELLNESS VISIT, SUBSEQUENT: Primary | ICD-10-CM

## 2022-07-05 DIAGNOSIS — I25.9 ISCHEMIC HEART DISEASE: ICD-10-CM

## 2022-07-05 PROCEDURE — 99213 OFFICE O/P EST LOW 20 MIN: CPT | Performed by: INTERNAL MEDICINE

## 2022-07-05 PROCEDURE — 1125F AMNT PAIN NOTED PAIN PRSNT: CPT | Performed by: INTERNAL MEDICINE

## 2022-07-05 PROCEDURE — G0439 PPPS, SUBSEQ VISIT: HCPCS | Performed by: INTERNAL MEDICINE

## 2022-07-05 RX ORDER — ALPRAZOLAM 0.25 MG/1
0.25 TABLET, ORALLY DISINTEGRATING ORAL
Refills: 0 | COMMUNITY
Start: 2022-07-05

## 2022-07-23 ENCOUNTER — TELEPHONE (OUTPATIENT)
Dept: INTERNAL MEDICINE CLINIC | Facility: CLINIC | Age: 87
End: 2022-07-23

## 2022-07-23 NOTE — TELEPHONE ENCOUNTER
Dr. Dawson Gallardo please advise  Last Tsosie Miracle was 2022  Last script has now  written 2021 #90 with 2 RF  Last dispensed 2022 #60

## 2022-07-25 RX ORDER — TRAMADOL HYDROCHLORIDE 50 MG/1
TABLET ORAL
Qty: 60 TABLET | Refills: 3 | Status: SHIPPED | OUTPATIENT
Start: 2022-07-25

## 2022-09-06 ENCOUNTER — TELEPHONE (OUTPATIENT)
Dept: INTERNAL MEDICINE CLINIC | Facility: CLINIC | Age: 87
End: 2022-09-06

## 2022-09-06 NOTE — TELEPHONE ENCOUNTER
Order received from [facility] for continued ovygen services. Order not placed by Dr Armenta Liter  [facility]  left with Verito at   X [phone]. Office number provided and faxed to  with confirmation received.

## 2022-09-08 ENCOUNTER — TELEPHONE (OUTPATIENT)
Dept: PULMONOLOGY | Facility: CLINIC | Age: 87
End: 2022-09-08

## 2022-09-08 NOTE — TELEPHONE ENCOUNTER
Received oxygen therapy SWO from [address]. Form placed in Dr. Rocael Neville folder for signature.

## 2022-11-28 ENCOUNTER — OFFICE VISIT (OUTPATIENT)
Dept: PULMONOLOGY | Facility: CLINIC | Age: 87
End: 2022-11-28
Payer: MEDICARE

## 2022-11-28 VITALS
BODY MASS INDEX: 33.86 KG/M2 | HEIGHT: 72 IN | WEIGHT: 250 LBS | OXYGEN SATURATION: 91 % | DIASTOLIC BLOOD PRESSURE: 54 MMHG | SYSTOLIC BLOOD PRESSURE: 92 MMHG | RESPIRATION RATE: 14 BRPM | HEART RATE: 78 BPM

## 2022-11-28 DIAGNOSIS — J43.1 PANLOBULAR EMPHYSEMA (HCC): Primary | ICD-10-CM

## 2022-11-28 PROCEDURE — 99213 OFFICE O/P EST LOW 20 MIN: CPT | Performed by: INTERNAL MEDICINE

## 2022-11-28 PROCEDURE — 1126F AMNT PAIN NOTED NONE PRSNT: CPT | Performed by: INTERNAL MEDICINE

## 2022-11-28 NOTE — PROGRESS NOTES
The patient is an 68-year-old male who I know well from prior evaluation comes in now for follow-up. He notes that his breathing is about the same. He has noted occasional bradycardia. Review of Systems:  Vision normal. Ear nose and throat normal. Bowel normal. Bladder function normal. No depression. No thyroid disease. No lymphatic system concerns. No rash. Muscles and joints unremarkable. No weight loss no weight gain. Physical Examination:  Vital signs normal. HEENT examination is unremarkable with pupils equal round and reactive to light and accommodation. Neck without adenopathy, thyromegaly, JVD nor bruit. Lungs clear to auscultation and percussion. Cardiac regular rate and rhythm no murmur. Abdomen nontender, without hepatosplenomegaly and no mass appreciable. Extremities and Musculoskeletal without clubbing cyanosis nor edema, and mobility acceptable. Neurologic grossly intact with symmetric tone and strength and reflex. Assessment and plan:  1. COPD-doing fairly well. The polycythemia has resolved. Recommendations: Vigilance with oxygen, current inhalers including Trelegy, annual flu shot, COVID booster, contact me promptly if new troubles and see me again at 6-month interval or sooner if needed. 2.  History of cardiomyopathy    3.   History of polycythemia-most recent hemoglobin is 15.2

## 2022-12-07 PROBLEM — J69.0 ASPIRATION PNEUMONIA DUE TO INHALATION OF VOMITUS (HCC): Status: ACTIVE | Noted: 2022-01-01

## 2022-12-07 PROBLEM — R06.03 RESPIRATORY DISTRESS: Status: ACTIVE | Noted: 2022-01-01

## 2022-12-07 PROBLEM — N17.9 ACUTE KIDNEY INJURY (HCC): Status: ACTIVE | Noted: 2022-01-01

## 2022-12-07 PROBLEM — D72.829 LEUKOCYTOSIS, UNSPECIFIED TYPE: Status: ACTIVE | Noted: 2022-01-01

## 2022-12-07 NOTE — VASCULAR ACCESS
Per RN Argelia Easley, hold off on placing PICC line for patient. Will call back if anything changes.

## 2022-12-07 NOTE — PROGRESS NOTES
[facility] Note: Antimicrobial Weight Based Dose Adjustment for: piperacillin/tazobactam (Alex Binder)    Alivia Ellsworth is a 82year old patient who has been prescribed piperacillin/tazobactam (ZOSYN) 3.375 gm ivpb x 1 dose. Estimated Creatinine Clearance: 23.7 mL/min (A) (based on SCr of 2.5 mg/dL (H)). Wt Readings from Last 6 Encounters:  11/28/22 : 113.4 kg (250 lb)  07/05/22 : 112.5 kg (248 lb)  10/18/21 : 109.3 kg (241 lb)  06/01/21 : 108.9 kg (240 lb)  03/23/21 : 108.9 kg (240 lb)  01/13/21 : 112.5 kg (248 lb)    There is no height or weight on file to calculate BMI.     Based on this criteria and renal function, dose will be adjusted to piperacillin/tazobactam (ZOSYN) 4.5 gm ivpb x 1 dose    Thank you,    Radha Perry, PharmD  12/7/2022  4:33 AM

## 2022-12-07 NOTE — ED INITIAL ASSESSMENT (HPI)
Patient presents to the ED via EMS from home for shortness of breath, N/V, weakness, and being hypotensive. Per EMS, pt went unresponsive for approx 15 minutes when placed into ambulance. Pt presents on 15 liters NRB with cold and clammy skin. DEXI 188.

## 2022-12-07 NOTE — PROGRESS NOTES
Laura Cason  peacefully with his wife, Obi Boston, at bedside at 1626. Absent spont resps, absent heart sounds and pulse.  and myself at bedside with wife during this time. Dr. Mendez notified.  notified per protocol for <24hrs admission, body released. [facility] notified, not a candidate for any donation per Brenda Board, case ID [id number]. Son en route from [facility] should be here within the hour.

## 2022-12-07 NOTE — RESPIRATORY THERAPY NOTE
RT called to ED for BiPAP initiation. RT found pt to be on NR breather mask. Suctioned small amount of tan/brown thick secretions nasal tracheally. Pt placed on BiPAP 12/5/60%  Pt tolerating and saturating appropriately. ABG done with results reported to ED MD.    Pt transported from ED to ICU with ED RN and ED Tech with no incidents. Pt on BiPAP settings 12/5/40%  FiO2 weaned from 60% to 40%  Pt tolerating and saturating appropriately. RT to continue to monitor.

## 2022-12-07 NOTE — PROGRESS NOTES
12/07/22 1645   Clinical Encounter Type   Visited With Family   Continue Visiting No   Crisis Visit Death;Critical care   Referral From Nurse   Referral To    Family Spiritual Encounters   Family Participation in [place] During Treatment Consistently   Taxonomy   Intended Effects Helping someone feel comforted   Methods Offer support; Offer spiritual/Catholic support; Offer emotional support; Accompany someone in their spiritual/Catholic practice outside your bill tradition;Encourage self care;Encourage story-telling;Encourage sharing of feelings   Interventions Silent prayer; Acknowledge current situation; Active listening; Ask guided questions       Discussion:  responded to nurse's referral for Spiritual Support at end of life. Patient was non-responsive with his wife Mallory Solis) sitting at the bedside.  offered pastoral presence and spiritual/emotional support.  completed Release Log and Belongings Sheet with Gato Olson. Gato Olson is aware of the 's availability and was encouraged to call if she or her son desired any additional support. Chaplains are available for a follow-up visit PRN and can be reached at [phone]. Malik Hoyt M.Div, Chaplain Resident.

## 2022-12-08 ENCOUNTER — TELEPHONE (OUTPATIENT)
Dept: PULMONOLOGY | Facility: CLINIC | Age: 87
End: 2022-12-08

## 2022-12-08 NOTE — TELEPHONE ENCOUNTER
Received death certificate. Signed by Dr. Roz Omalley. Faxed received confirmation. Awaiting courtesy copy.

## 2022-12-08 NOTE — PROGRESS NOTES
Patient was received on continuous BiPAP. Routine ventilator assessment has been performed and documented. 12/07/22 1200   BiPAP   $ RT Standby Charge (per 15 min) 1  (Neb/BiPAP check)   Device V60   BiPAP / CPAP CE# [id number]   BiPAP bacteria filter Yes   BIPAP plugged into main power?  Yes   Mode Spontaneous/Timed   Interface Full face mask   Mask Size Large   Control Settings   Set Rate 14 breaths/min   Set IPAP 12   Set EPAP 5   Oxygen Percent 50 %  (Found on 50%)   Inspiratory time 1   Insp rise time 2   SIPAP   Resp (!) 37   FiO2 (%) 50 %   BiPAP/CPAP Alarm Settings   Hi Rate 50   Low Rate 10   Hi VT 1500   Low    Hi Pressure 25   Low Pressure 5   Low Pressure Delay 20   Low MV 3   BiPAP/CPAP Monitored Parameters   Pulse 107   SpO2 93 %   PIP 15   Total Rate 38 breaths/min   Minute Volume 14   Tidal Volume 397   Total Leak 10   Trigger % 100   Ti/Ttot % 27   IPAP 12   EPAP 5   Toleration Well

## 2022-12-09 NOTE — DISCHARGE SUMMARY
Discharge Summary    Date of Admission: 2022    Date of Discharge: 2022    Hospital Course: The patient was admitted to the hospital with acute dyspnea of uncertain etiology with associated shock state and severe metabolic acidosis with acute kidney injury. He had a history of severe cardiomyopathy. Multiple tests were ordered including renal ultrasound and lower extremity scanning as well as multiple empiric therapies per the sepsis protocol. However, the patient was explicit that he did not want to have CPR nor would he ever want to be intubated for any reason. With the wife at the bedside, the patient developed refractory hypotension. PAP therapy was applied throughout his hospitalization. He  peacefully. Discharge Medications:     Medication List      ASK your doctor about these medications    allopurinol 300 MG Tabs  Commonly known as: Zyloprim  TAKE 1 TABLET DAILY     ALPRAZolam 0.25 MG Tbdp     aspirin 81 MG Tabs     budesonide 32 MCG/ACT Susp  Commonly known as: Rhinocort Aqua     carvedilol 12.5 MG Tabs  Commonly known as: Coreg  TAKE 1 TABLET TWICE A DAY     clopidogrel 75 MG Tabs  Commonly known as: Plavix  TAKE 1 TABLET DAILY     Entresto 24-26 MG Tabs  Generic drug: sacubitril-valsartan  TAKE 1 TABLET TWICE A DAY     eplerenone 25 MG Tabs  Commonly known as: Inspra  TAKE 1 TABLET DAILY     ezetimibe 10 MG Tabs  Commonly known as: Zetia  TAKE ONE-HALF (1/2) TABLET DAILY     levalbuterol 0.31 MG/3ML Nebu  Commonly known as: Xopenex     NitroMist 400 MCG/SPRAY Aers  Generic drug: Nitroglycerin  1 spray (0.4MG)  under the tongue at the first sign of an attack; no more than 3 sprays are recommended within a 15 minute period.      ProAir  (90 Base) MCG/ACT Aers  Generic drug: albuterol  USE 2 INHALATIONS AS NEEDED     rosuvastatin 40 MG Tabs  Commonly known as: Crestor  TAKE 1 TABLET DAILY     traMADol 50 MG Tabs  Commonly known as: Ultram  TAKE 1 TABLET BY MOUTH EVERY 12 HOURS AS NEEDED FOR PAIN     Trelerenny Ellipta 100-62.5-25 MCG/ACT Aepb  Generic drug: fluticasone-umeclidin-vilant  USE 1 INHALATION DAILY     Vitamin D 50 MCG (2000 UT) Tabs            Discharge Plans:  None    Discharge Diagnosis:  Acute on chronic respiratory failure, refractory shock state, cardiomyopathy, acute kidney injury    Kathie Reid MD  Medical Director, [address] [facility]  Medical Director, [address] [place] [address] X  Pager: [phone]

## 2023-03-16 NOTE — PROGRESS NOTES
The patient is 51-year-old male who I know well from prior evaluation who comes in now for follow-up. He notes his breathing is about the same. He underwent cardiac catheterization which did not reveal critical disease.   He had a tiny pulmonary nodule wh Quality 431: Preventive Care And Screening: Unhealthy Alcohol Use - Screening: Patient not identified as an unhealthy alcohol user when screened for unhealthy alcohol use using a systematic screening method Quality 226: Preventive Care And Screening: Tobacco Use: Screening And Cessation Intervention: Patient screened for tobacco use and is an ex/non-smoker Detail Level: Detailed Quality 111:Pneumonia Vaccination Status For Older Adults: Pneumococcal vaccine (PPSV23) administered on or after patient’s 60th birthday and before the end of the measurement period Quality 130: Documentation Of Current Medications In The Medical Record: Current Medications Documented Quality 110: Preventive Care And Screening: Influenza Immunization: Influenza Immunization not Administered for Documented Reasons.

## (undated) NOTE — MR AVS SNAPSHOT
[facility]  [address] [place] [address] [place] [address] [phone]               Thank you for choosing us for your health care visit with Joshua Lees MD.  We are glad to serve you and happy to provide you with this summary of your visit. BREO ELLIPTA 100-25 MCG/INH Aepb   Generic drug:  Fluticasone Furoate-Vilanterol           CRESTOR 40 MG Tabs   Generic drug:  Rosuvastatin Calcium   Take one daily           Enalapril Maleate 2.5 MG Tabs   Take 1 tablet (2.5 mg total) by mouth daily.    C Call the [employer] for assistance with your inactive [employer] account    If you have questions, you can call [phone] to talk to our [facility] Staff. Remember, [employer] is NOT to be used for urgent needs. For medical emergencies, dial 911.     Vc

## (undated) NOTE — MR AVS SNAPSHOT
MIKE Ludlow  [facility] [address]  [phone]               Thank you for choosing us for your health care visit with Christiano Bird MD.  We are glad to serve you and happy to provide you with this summary of your visit.   Anita CRESTOR 40 MG Tabs   Generic drug:  Rosuvastatin Calcium   Take one daily           Enalapril Maleate 2.5 MG Tabs   Take 1 tablet (2.5 mg total) by mouth daily.    Commonly known as:  VASOTEC           eplerenone 25 MG Tabs   Take 1 tablet (25 mg total) by

## (undated) NOTE — LETTER
1/30/2018    Dear Carloz Del Rosario,     It has come to our attention that the enclosed required test is due in February. This test was ordered by Dr. Janelle Giordano. This test requires a prior authorization.  The [facility] at [phone] will

## (undated) NOTE — LETTER
April 18, 2019    [address] [address]      Dear Dinora Duff: The following are the results of your recent tests ordered by [facility]. Please review the list of test results.   Your result is the number on the left; w